# Patient Record
Sex: FEMALE | Race: WHITE | Employment: UNEMPLOYED | URBAN - METROPOLITAN AREA
[De-identification: names, ages, dates, MRNs, and addresses within clinical notes are randomized per-mention and may not be internally consistent; named-entity substitution may affect disease eponyms.]

---

## 2019-12-27 ENCOUNTER — OFFICE VISIT (OUTPATIENT)
Dept: SURGERY | Age: 29
End: 2019-12-27

## 2019-12-27 VITALS
WEIGHT: 293 LBS | SYSTOLIC BLOOD PRESSURE: 142 MMHG | DIASTOLIC BLOOD PRESSURE: 86 MMHG | HEART RATE: 79 BPM | RESPIRATION RATE: 20 BRPM | OXYGEN SATURATION: 97 % | TEMPERATURE: 98.2 F | BODY MASS INDEX: 44.41 KG/M2 | HEIGHT: 68 IN

## 2019-12-27 DIAGNOSIS — K21.9 GASTROESOPHAGEAL REFLUX DISEASE, ESOPHAGITIS PRESENCE NOT SPECIFIED: ICD-10-CM

## 2019-12-27 DIAGNOSIS — E66.01 MORBID OBESITY WITH BMI OF 45.0-49.9, ADULT (HCC): Primary | ICD-10-CM

## 2019-12-27 DIAGNOSIS — E55.9 VITAMIN D DEFICIENCY: ICD-10-CM

## 2019-12-27 DIAGNOSIS — E61.1 IRON DEFICIENCY: ICD-10-CM

## 2019-12-27 DIAGNOSIS — R63.5 WEIGHT GAIN: ICD-10-CM

## 2019-12-27 DIAGNOSIS — E53.8 B12 DEFICIENCY: ICD-10-CM

## 2019-12-27 RX ORDER — OMEPRAZOLE 20 MG/1
20 CAPSULE, DELAYED RELEASE ORAL DAILY
COMMUNITY
End: 2020-09-29 | Stop reason: SDUPTHER

## 2019-12-27 NOTE — PATIENT INSTRUCTIONS
Schedule an appointment with the dietician, please call or email   Ethelyn Kayser, RD at:    [phone]  [email or web address]      I'll follow up when I get your labs back    Watch or attend an informational seminar on gastric bypass     After you have had a visit with Ethelyn Kayser, RD, watched the seminar, had labs and the upper GI; [facility] will get you an appointment with Dr. Katherine Camacho to discuss revision

## 2019-12-27 NOTE — PROGRESS NOTES
1. Have you been to the ER, urgent care clinic since your last visit? Hospitalized since your last visit? No    2. Have you seen or consulted any other health care providers outside of the [address] [place] [address] since your last visit? Include any pap smears or colon screening. Yes OB/GYN 3 weeks ago for birth control.

## 2019-12-27 NOTE — PROGRESS NOTES
HISTORY OF PRESENT ILLNESS  Ani Samson is a 26 y.o. female. HPI  Chief Complaint   Patient presents with    Weight Gain     6 years s/p lap sleeve gastrectomy gained 49.1 pounds since last office visit. Ani Samson is 6 years s/p Sleeve gastrectomy for treatment of morbid obesity. She has not been in follow up for 4 years. She desires revision to Malabsorptive gastric bypass for treatment of morbid obesity. She has had 2 children over the past 4 years (has a 8year old, 1year old and 4 month old). Pre op weight  318 lbs   Luis   208 lbs   Current  298 lbs         Review of Systems   Constitutional: Positive for malaise/fatigue. Spouse works as  and \"all me\" most of the time with the kids and house. Tired all the time      Eyes: Negative for blurred vision. Respiratory: Negative for cough and shortness of breath. Cardiovascular: Negative for chest pain, palpitations and leg swelling. Gastrointestinal: Positive for diarrhea (always have to go ) and heartburn (prn and takes spouse's omeprazole 3 x/ week ). Negative for constipation. 1 meal / day \"just not hungry\"  Drinks 2-3 cups whole milk daily \"love milk\"  Not snacking   Main meal is dinner and \"usually not great\"   Potatoes, meat and green beans; portions > cup    Genitourinary: Negative. Musculoskeletal: Positive for myalgias. Running around after kids all day   Walks dog nightly    Skin: Positive for itching. eczema   Neurological: Negative for dizziness, tingling, seizures and headaches. Endo/Heme/Allergies:        Periods \"all over the place\"   Hx anemia     No PICA    Psychiatric/Behavioral: The patient does not have insomnia. Stress high due to young children and spouse away a lot as a         Physical Exam  Constitutional:       Appearance: She is obese. Eyes:      Extraocular Movements: Extraocular movements intact.       Pupils: Pupils are equal, round, and reactive to light. Cardiovascular:      Rate and Rhythm: Normal rate and regular rhythm. Pulmonary:      Effort: Pulmonary effort is normal.      Breath sounds: Normal breath sounds. Abdominal:      General: Bowel sounds are normal.      Palpations: Abdomen is soft. Comments: Obese     Musculoskeletal: Normal range of motion. General: No swelling. Skin:     General: Skin is warm and dry. Neurological:      General: No focal deficit present. Mental Status: She is alert and oriented to person, place, and time. Psychiatric:         Mood and Affect: Mood normal.         ASSESSMENT and PLAN    ICD-10-CM ICD-9-CM    1. Morbid obesity with BMI of 45.0-49.9, adult ([facility]) E66.01 278.01 CBC W/O DIFF    Z68.42 V85.42 VITAMIN B12 & FOLATE      VITAMIN D, 25 HYDROXY      METABOLIC PANEL, COMPREHENSIVE      IRON PROFILE      VITAMIN B1, WHOLE BLOOD      XR UPPER GI SERIES W KUB   2. Weight gain R63.5 783.1 CBC W/O DIFF      VITAMIN B12 & FOLATE      VITAMIN D, 25 HYDROXY      METABOLIC PANEL, COMPREHENSIVE      IRON PROFILE      VITAMIN B1, WHOLE BLOOD      XR UPPER GI SERIES W KUB   3. Gastroesophageal reflux disease, esophagitis presence not specified K21.9 530.81 CBC W/O DIFF      VITAMIN B12 & FOLATE      VITAMIN D, 25 HYDROXY      METABOLIC PANEL, COMPREHENSIVE      IRON PROFILE      VITAMIN B1, WHOLE BLOOD      XR UPPER GI SERIES W KUB   4. Iron deficiency E61.1 280.9 CBC W/O DIFF      VITAMIN B12 & FOLATE      VITAMIN D, 25 HYDROXY      METABOLIC PANEL, COMPREHENSIVE      IRON PROFILE      VITAMIN B1, WHOLE BLOOD   5. B12 deficiency E53.8 266.2 CBC W/O DIFF      VITAMIN B12 & FOLATE      VITAMIN D, 25 HYDROXY      METABOLIC PANEL, COMPREHENSIVE      IRON PROFILE      VITAMIN B1, WHOLE BLOOD   6.  Vitamin D deficiency E55.9 268.9 CBC W/O DIFF      VITAMIN B12 & FOLATE      VITAMIN D, 25 HYDROXY      METABOLIC PANEL, COMPREHENSIVE      IRON PROFILE      VITAMIN B1, WHOLE BLOOD     6 years s/p Sleeve gastrectomy for treatment of morbid obesity   Weight regain and BMI > 40   Needs to get back in follow up  Labs today and assess vitamins, will make recs once resulted   UGI to eval anatomy   RD visit   Seminar on gastric bypass   Reviewed differences between sleeve and gastric bypass, importance of follow up, vitamin compliance and diet/behavior changes. After RD, UGI, Labs and seminar  ; appt with Dr. Verónica Barksdale to discuss revision to Malabsorptive gastric bypass for treatment of morbid obesity   Will get her started with 6 month diet and insurance requirements   Brie Gonsalez verbalized understanding and questions were answered to the best of my knowledge and ability. Gastric bypass  educational materials were provided. 33 minutes spent in face to face with Brie Gonsalez > 50% counseling.

## 2020-01-01 LAB
25(OH)D3+25(OH)D2 SERPL-MCNC: 32.5 NG/ML (ref 30–100)
ALBUMIN SERPL-MCNC: 4.6 G/DL (ref 3.5–5.5)
ALBUMIN/GLOB SERPL: 1.4 {RATIO} (ref 1.2–2.2)
ALP SERPL-CCNC: 66 IU/L (ref 39–117)
ALT SERPL-CCNC: 49 IU/L (ref 0–32)
AST SERPL-CCNC: 43 IU/L (ref 0–40)
BILIRUB SERPL-MCNC: 0.3 MG/DL (ref 0–1.2)
BUN SERPL-MCNC: 12 MG/DL (ref 6–20)
BUN/CREAT SERPL: 17 (ref 9–23)
CALCIUM SERPL-MCNC: 10 MG/DL (ref 8.7–10.2)
CHLORIDE SERPL-SCNC: 102 MMOL/L (ref 96–106)
CO2 SERPL-SCNC: 24 MMOL/L (ref 20–29)
CREAT SERPL-MCNC: 0.7 MG/DL (ref 0.57–1)
ERYTHROCYTE [DISTWIDTH] IN BLOOD BY AUTOMATED COUNT: 15.2 % (ref 12.3–15.4)
FOLATE SERPL-MCNC: 9.6 NG/ML
GLOBULIN SER CALC-MCNC: 3.2 G/DL (ref 1.5–4.5)
GLUCOSE SERPL-MCNC: 87 MG/DL (ref 65–99)
HCT VFR BLD AUTO: 36.1 % (ref 34–46.6)
HGB BLD-MCNC: 11.3 G/DL (ref 11.1–15.9)
IRON SATN MFR SERPL: 10 % (ref 15–55)
IRON SERPL-MCNC: 33 UG/DL (ref 27–159)
MCH RBC QN AUTO: 25.5 PG (ref 26.6–33)
MCHC RBC AUTO-ENTMCNC: 31.3 G/DL (ref 31.5–35.7)
MCV RBC AUTO: 82 FL (ref 79–97)
PLATELET # BLD AUTO: 393 X10E3/UL (ref 150–450)
POTASSIUM SERPL-SCNC: 4.5 MMOL/L (ref 3.5–5.2)
PROT SERPL-MCNC: 7.8 G/DL (ref 6–8.5)
RBC # BLD AUTO: 4.43 X10E6/UL (ref 3.77–5.28)
SODIUM SERPL-SCNC: 140 MMOL/L (ref 134–144)
TIBC SERPL-MCNC: 327 UG/DL (ref 250–450)
UIBC SERPL-MCNC: 294 UG/DL (ref 131–425)
VIT B1 BLD-SCNC: 105.6 NMOL/L (ref 66.5–200)
VIT B12 SERPL-MCNC: 472 PG/ML (ref 232–1245)
WBC # BLD AUTO: 9.3 X10E3/UL (ref 3.4–10.8)

## 2020-01-14 ENCOUNTER — CLINICAL SUPPORT (OUTPATIENT)
Dept: SURGERY | Age: 30
End: 2020-01-14

## 2020-01-14 ENCOUNTER — HOSPITAL ENCOUNTER (OUTPATIENT)
Dept: GENERAL RADIOLOGY | Age: 30
Discharge: HOME | End: 2020-01-14
Attending: NURSE PRACTITIONER
Payer: MEDICAID

## 2020-01-14 DIAGNOSIS — E66.01 MORBID OBESITY WITH BMI OF 45.0-49.9, ADULT (HCC): ICD-10-CM

## 2020-01-14 DIAGNOSIS — E66.01 MORBID OBESITY WITH BMI OF 45.0-49.9, ADULT (HCC): Primary | ICD-10-CM

## 2020-01-14 DIAGNOSIS — R63.5 WEIGHT GAIN: ICD-10-CM

## 2020-01-14 DIAGNOSIS — K21.9 GASTROESOPHAGEAL REFLUX DISEASE, ESOPHAGITIS PRESENCE NOT SPECIFIED: ICD-10-CM

## 2020-01-14 PROCEDURE — 74240 X-RAY XM UPR GI TRC 1CNTRST: CPT

## 2020-01-15 VITALS — WEIGHT: 293 LBS | BODY MASS INDEX: 45.98 KG/M2

## 2020-01-15 NOTE — PROGRESS NOTES
Pre-operative Bariatric Nutrition Evaluation ()     Date: 2020  Lala Zaragoza M.D. Name: Gina Lujan  :  1990  Age:  31  Gender: Female   Type of Surgery: [x]           Gastric Bypass   []           Sleeve Gastrectomy    ASSESSMENT:    Past Medical History:HTN     Medications/Supplements:   Prior to Admission medications    Medication Sig Start Date End Date Taking? Authorizing Provider   levonorgestrel (MIRENA) 20 mcg/24 hours (5 yrs) 52 mg IUD 1 Device by IntraUTERine route once. Provider, Historical   omeprazole (PRILOSEC) 20 mg capsule Take 20 mg by mouth daily. Provider, Historical   prenatal vits w-Ca,Fe,FA,1 mg, (PRENATAL 1 + IRON PO) Take  by mouth. Provider, Historical       Food Allergies/Intolerances:none    Anthropometrics:    Ht:67.5\"   Wt: 298#    IBW: 137.5#     %IBW: 216%     BMI:45     Category: obesity III     Reported wt history: Pt presents today for pre-op nutrition evaluation for wt loss surgery. Pt with previous sleeve gastrectomy in . Reports losing from 348# down to 184# indicating a 164# wt loss. States she maintained the wt loss for several years and began re-gaining wt d/t pregnancies. Pt currently has a 3year old and a 2 month old. Pt does report \"never feeling hungry\" and feeling full from no more than 1 cup food per meal. Pt is now seeking approval for weight loss surgery revision to gastric bypass. Pt will need to complete 6 months of supervised wt loss for insurance requirements.    Exercise/Physical Activity:no intentional exercise stating lack of time    Reported Diet History:Atkins, Weight Watchers, diet pills, liquid diets    24 Hour Diet Recall  Breakfast  2 eggs, hernandez or sausage, whole milk \"all day\"    Lunch  Usually skips stating \"never hungry\"    Dinner  Protein, veggie, starch    Snacks  Veggies and dip    Beverages  Whole milk \"all day\", water, tea; states water intake is minimal d/t heartburn Environment/Psychosocial/Support:pt reports , mother and mother-in-law are main support system and rates level of support a 10 out of 10. 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 She is often managing her two young children on her own (ages 3 and 1 months old). Pt's mother helps out with the kids. Pt does all of the grocery shopping and cooking for their household. Pt states she has several friends who have had gastric bypass and have done well. NUTRITION DIAGNOSIS:  1. Self-monitoring deficit r/t skipping meals evidenced by pt reports skipping lunch most days d/t \"not feeling hungry\". 2. Physical inactivity r/t perception that time constraints prevent exercise evidenced by pt with no intentional exercise at this time. NUTRITION INTERVENTION:  Pt educated on nutrition recommendations for weight loss surgery, specifically gastric bypass. Instructed on consuming 3 meals per day starting now. Use the balanced plate method to plan meals, include 3 oz of lean source of protein, 1/2 cup whole grains, unlimited non-starchy vegetables, 1/2 cup fruit and 1 serving of low fat dairy. Utilize handouts listing healthy snack and meal ideas to limit restaurant meals. After surgery measure all meals to 1/2 cup. Each meal will contain a 1/4 cup lean protein and 1/4 cup fruit, non-starchy vegetable or starch (limiting to once per day). Aim for 60 g protein per day. Sip on 48-64 oz of sugar free, calorie free, non-carbonated beverages each day. Do not use a straw. Do not consume beverages 30 minutes before, during or 30 minutes after meals. Read all nutrition labels. Demonstrated and emphasized identifying serving size, total fat, sugar and protein content. Defined low fat as </= 3 g per serving. Discussed lean and extra lean sources of protein. Provided list of low fat cooking methods.  Avoid foods with sugar listed in the first 3 ingredients and >/15 g sugar per serving. Excess sugar/fat intake may lead to dumping syndrome. Discussed signs and symptoms of dumping syndrome. Practice mindful eating habits; take small bites, chew thoroughly, avoid distractions, utilize hunger/fullness scale. Consume meals over 20-30 minutes. Attend Bariatric Support Group and increase physical activity (approved per MD) for long term weight maintenance. NUTRITION MONITORING AND EVALUATION:    The following goals were established with patient;  1. Replace whole milk with lower fat/lower calorie alternatives. Specific examples provided. Drink mostly calorie-free, sugar-free and non-carbonated fluids and avoid drinking with meals (30/30 rule). 2. Eat 3 meals a day. Do not skip meals. Use protein shakes at lunch instead of skipping the meal to help achieve adequate protein intake which can help promote wt loss. 3. Measure meals to 1 cup total per meal (pt still feels restricted with this portion size). Include lean protein and produce at each meal. Sample meal ideas provided. 4. Implement intentional exercise to help promote wt loss. We discussed using online exercise videos while children are napping during the day. 5. Follow up next month for continued nutrition education and supervised weight loss. Specific tips and techniques to facilitate compliance with above recommendations were provided and discussed. Nutrition evaluation reveals important lifestyle changes are indicated. Goals set and recommendations made. Will continue to assess as pt works to complete supervised weight loss requirements. If further details are desired please feel free to contact me at [phone]. This phone number was also provided to the patient for any further questions or concerns.            Sheldon Mcnally RD

## 2020-02-06 ENCOUNTER — OFFICE VISIT (OUTPATIENT)
Dept: SURGERY | Age: 30
End: 2020-02-06

## 2020-02-06 VITALS
HEART RATE: 88 BPM | WEIGHT: 293 LBS | OXYGEN SATURATION: 98 % | DIASTOLIC BLOOD PRESSURE: 104 MMHG | TEMPERATURE: 98.8 F | BODY MASS INDEX: 44.41 KG/M2 | HEIGHT: 68 IN | SYSTOLIC BLOOD PRESSURE: 160 MMHG | RESPIRATION RATE: 20 BRPM

## 2020-02-06 DIAGNOSIS — J45.20 MILD INTERMITTENT ASTHMA WITHOUT COMPLICATION: ICD-10-CM

## 2020-02-06 DIAGNOSIS — I10 ESSENTIAL HYPERTENSION: ICD-10-CM

## 2020-02-06 DIAGNOSIS — E66.01 MORBID OBESITY (HCC): Primary | ICD-10-CM

## 2020-02-06 DIAGNOSIS — R12 HEARTBURN: ICD-10-CM

## 2020-02-06 NOTE — PROGRESS NOTES
1. Have you been to the ER, urgent care clinic since your last visit? Hospitalized since your last visit? No    2. Have you seen or consulted any other health care providers outside of the [address] [place] [address] since your last visit? Include any pap smears or colon screening.  No

## 2020-02-10 NOTE — PROGRESS NOTES
Subjective: The patient is a 31 y.o. White female with a history of morbid obesity managed via sleeve gastrectomy in ; lost approximately 115 lbs but has experienced gradual weight regain of 90 lbs with 2 recent pregnancies. Bariatric comorbidities present are   Patient Active Problem List   Diagnosis Code    Morbid obesity ([facility] E66.01    Hypertension I10    Asthma J45.909    Chronic headaches R51    Snoring R06.83    Low back pain M54.5    Bilateral knee pain M25.561, M25.562    Chronic foot pain M79.673, G89.29    Heartburn R12    Anemia, iron deficiency D50.9    Vitamin D deficiency E55.9     The patient desires revision to gastric bypass for additional surgical weight loss. The patients goal weight is 190 lbs. The highest acceptable weight is 230 lbs. These goals are consistent with expected outcomes of their desired operation. her Medical goals are hypertension resolution;  her qualty of life goals are decreased fatigue.     Patient Active Problem List    Diagnosis Date Noted    Anemia, iron deficiency 2013    Vitamin D deficiency 2013    Morbid obesity ([facility] 2012    Hypertension 2012    Asthma 2012    Chronic headaches 2012    Snoring 2012    Low back pain 2012    Bilateral knee pain 2012    Chronic foot pain 2012    Heartburn 2012      Past Surgical History:   Procedure Laterality Date    ABDOMEN SURGERY PROC UNLISTED      HX  SECTION  2019    HX CHOLECYSTECTOMY      laparoscopic  in     HX GASTRECTOMY  13    lap slleve gastrectomy by Dr Tala Beltre  2007    jaws wired shut and bone graft at [facility] by Dr Priyanka Ortega  2009    humerous reconstruction after injury by Dr Paresh Dover at [place] [address]    at [place] [address] Use    Smoking status: Former Smoker     Packs/day: 0.50     Years: 7.00     Pack years: 3.50     Last attempt to quit: 2011     Years since quittin.3    Smokeless tobacco: Never Used   Substance Use Topics    Alcohol use: Yes     Alcohol/week: 1.7 standard drinks     Types: 2 Cans of beer per week     Comment: RARELY      Family History   Problem Relation Age of Onset    Obesity Mother    Oswlado Olivera Migraines Mother     Hypertension Mother     Kidney Disease Brother     Cancer Maternal Grandmother         thyroid cancer    Heart Disease Maternal Grandfather     Hypertension Maternal Grandfather       Prior to Admission medications    Medication Sig Start Date End Date Taking? Authorizing Provider   levonorgestrel (MIRENA) 20 mcg/24 hours (5 yrs) 52 mg IUD 1 Device by IntraUTERine route once. Yes Provider, Historical   omeprazole (PRILOSEC) 20 mg capsule Take 20 mg by mouth daily. Yes Provider, Historical   prenatal vits w-Ca,Fe,FA,1 mg, (PRENATAL 1 + IRON PO) Take  by mouth.    Yes Provider, Historical     Allergies   Allergen Reactions    Latex Rash    Bactrim [Sulfamethoxazole-Trimethoprim] Rash    Ceclor [Cefaclor] Rash    Erythromycin Rash    Keflex [Cephalexin] Rash    Morphine Nausea Only    Sulfa (Sulfonamide Antibiotics) Rash         Review of Systems:    Constitutional: positive for weight gain  Eyes: positive for contacts/glasses  Ears, nose, mouth, throat, and face: positive for snoring  Respiratory: positive for asthma, wheezing or dyspnea on exertion, negative for pneumonia  Cardiovascular: negative for chest pressure/discomfort, palpitations  Gastrointestinal: positive for dyspepsia and reflux symptoms, negative for nausea and vomiting  Genitourinary:negative  Integument/breast: negative  Hematologic/lymphatic: negative  Musculoskeletal:positive for arthralgias and back pain  Neurological: negative for paresthesia    Objective:     Visit Vitals  BP (!) 160/104   Pulse 88   Temp 98.8 °F (37.1 °C)   Resp 20   Ht 5' 7.5\" (1.715 m)   Wt 296 lb (134.3 kg)   SpO2 98% BMI 45.68 kg/m²       Physical Exam:  GENERAL: alert, cooperative, no distress, appears stated age, morbidly obese, EYE: negative, LYMPHATIC: Cervical, supraclavicular nodes normal. THROAT & NECK: normal, LUNG: clear to auscultation bilaterally, HEART: regular rate and rhythm, S1, S2 normal, no murmur. ABDOMEN: Obese, non-distended, soft. NABS. Well healed scars. No pain with palpation or mass. EXTREMITIES:  extremities normal, atraumatic, no cyanosis or edema, SKIN: Normal., NEUROLOGIC: negative. Assessment:     Recurrent morbid obesity with comorbidities; no success with medical management. UGI with spontaneous reflux; normal morphology of sleeve. Plan:     Laparoscopic revision of sleeve gastrectomy to gastric bypass. This is a 31 y.o. female with a BMI of Body mass index is 45.68 kg/m². and the weight-related co-morbidities listed above. [facility] meets the NIH criteria for bariatric surgery based upon the BMI of Body mass index is 45.68 kg/m². and multiple weight-related co-morbidities. [facility] has elected Laparoscopic revision of sleeve gastrectomy to gastric bypass as her intervention of choice for treatment of morbid obesity through surgical means secondary to its long term history of success. In the office today, following Kimberley's history and physical examination, a 30 minute discussion regarding the anatomic alterations for the laparoscopic roula-en-Y gastric bypass was undertaken. The dietary expectations and the patient and physician dependent factors for success were thoroughly discussed, to include the need for interval follow-up and long-term dietary changes associated with success. The possible complications of the roula-en-Y gastric bypass  were also discussed, to include VTE, staple line leak, bleeding, stricture, infection, internal hernia, open procedure, GERD/ulcer/stricture, poor weight loss/weight regain, and pouch dilation.   Specific weight related outcomes for success were also discussed with an emphasis on careful and close follow-up with the first year. The patient expressed an understanding of the above factors, and her questions were answered in their entirety. In addition, the patient attended a 1.5 hour power point seminar regarding obesity, surgical weight loss including, adjustable gastric band, gastric bypass, and sleeve gastrectomy. This discussion contrasted the different surgical techniques, mechanisms of actions and expected outcomes, and surgical and medical risks associated with each procedure. During this seminar, there was a long question and answer session where each questions was answered until there were no additional questions. Today, the patient had all of her questions answered and desires to proceed with pre-qualification for revisional bariatric surgery initially choosing Laparoscopic revision of sleeve gastrectomy to gastric bypass as her surgical option. She will complete 6-month supervised weight loss program and schedule Psychology evaluation.     Signed By: Dayo Reyes MD     February 10, 2020

## 2020-02-10 NOTE — PATIENT INSTRUCTIONS
Learning About Bariatric Surgery  What is bariatric surgery? Bariatric surgery is surgery to help you lose weight. This type of surgery is only used for people who are very overweight and have not been able to lose weight with diet and exercise. This surgery makes the stomach smaller. Some types of surgery also change the connection between your stomach and intestines. How is bariatric surgery done? Bariatric surgery may be either \"open\" or \"laparoscopic. \" Open surgery is done through a large cut (incision) in the belly. Laparoscopic surgery is done through several small cuts. The doctor puts a lighted tube, or scope, and other surgical tools through small cuts in your belly. The doctor is able to see your organs with the scope. There are different types of bariatric surgery. Gastric sleeve surgery  The surgery is usually done through several small incisions in the belly. The doctor removes more than half of your stomach. This leaves a thin sleeve, or tube, that is about the size of a banana. Because part of your stomach has been removed, this can't be reversed. Christiano-en-Y gastric bypass surgery  Christiano-en-Y (say \"franco-en-why\") surgery changes the connection between the stomach and the intestines. The doctor separates a section of your stomach from the rest of your stomach. This makes a small pouch. The new pouch will hold the food you eat. The doctor connects the stomach pouch to the middle part of the small intestine. Gastric banding surgery  The surgery is usually done through several small incisions in the belly. The doctor wraps a band around the upper part of the stomach. This creates a small pouch. The small size of the pouch means that you will get full after you eat just a small amount of food. The doctor can inflate or deflate the band to adjust the size. This lets the doctor adjust how quickly food passes from the new pouch into the stomach.  It does not change the connection between the stomach and the intestines. What can you expect after the surgery? You may stay in the hospital for one or more days after the surgery. How long you stay depends on the type of surgery you had. Most people need 2 to 4 weeks before they are ready to get back to their usual routine. For the first 2 to 6 weeks after surgery, you probably will need to follow a liquid or soft diet. Bit by bit, you will be able to eat more solid foods. Your doctor may advise you to work with a dietitian. This way you'll be sure to get enough protein, vitamins, and minerals while you are losing weight. Even with a healthy diet, you may need to take vitamin and mineral supplements. After surgery, you will not be able to eat very much at one time. You will get full quickly. Try not to eat too much at one time or eat foods that are high in fat or sugar. If you do, you may vomit, get stomach pain, or have diarrhea. You probably will lose weight very quickly in the first few months after surgery. As time goes on, your weight loss will slow down. You will have regular doctor visits to check how you are doing. Think of bariatric surgery as a tool to help you lose weight. It isn't an instant fix. You will still need to eat a healthy diet and get regular exercise. This will help you reach your weight goal and avoid regaining the weight you lose. Follow-up care is a key part of your treatment and safety. Be sure to make and go to all appointments, and call your doctor if you are having problems. It's also a good idea to know your test results and keep a list of the medicines you take. Where can you learn more? Go to http://madison-gissel.info/. Enter G469 in the search box to learn more about \"Learning About Bariatric Surgery. \"  Current as of: March 28, 2019  Content Version: 12.2  © 0553-2925 [employer].  Care instructions adapted under license by [employer] (which disclaims liability or warranty for this information). If you have questions about a medical condition or this instruction, always ask your healthcare professional. [place] [address] any warranty or liability for your use of this information.

## 2020-02-12 ENCOUNTER — CLINICAL SUPPORT (OUTPATIENT)
Dept: SURGERY | Age: 30
End: 2020-02-12

## 2020-02-17 VITALS — WEIGHT: 293 LBS | BODY MASS INDEX: 46.79 KG/M2

## 2020-02-17 NOTE — PROGRESS NOTES
[employer] [facility]  Supervised Weight Loss     Date:   2020    Patient's Name: Oly Mcdonough  : 1990    Insurance:  [facility] [employer]         Session: 2 of  6  Surgery: Gastric Bypass with h/o Sleeve Gastrectomy   Surgeon:  Fransisco Meek M.D. Height: 67.5\" Weight:    303.2#      Lbs. (Please note weight was taken 2020 but was not recorded until  when this writer completed the note)   BMI: 46   Pounds Lost since last month: 0               Pounds Gained since last month: 5    Starting Weight: 298#   Previous Months Weight: 298#  Overall Pounds Lost: 0 Overall Pounds Gained: 5    Other Pertinent Information: n/a    Smoking Status:  none  Alcohol Intake: 2-3 drinks once a month    I have reviewed with pt the guidelines of the supervised wt loss program.  Pt understands the expectations of some wt loss during the program and that wt gain could delay the process. I have also explained that appointments need to be consecutive and missing an appointment may result in starting over. Pt has received this information in writing. Changes that patient has made since last month include:  None reported. Eating Habits and Behaviors  General healthy eating guidelines were also discussed. Pts were instructed that their plate should be made up 1/2 plate coming from non-starchy vegetables, 1/4 coming from lean meat, and 1/4 of their plate coming from carbohydrates, including fruits, starches, or milk. We discussed measuring meals to 1/2 cup total per meal after surgery. Drinking only calorie-free, sugar-free and non-carbonated beverages. We discussed the importance of drinking 64 ounces of fluid per day to prevent dehydration post-operatively.                        Patient's current diet habits include: eating 2-3 meals daily, snacking on cheese/crackers, grapes, pork rinds, and many other refined carbs, especially before dinner at home. Pt does the cooking and grocery shopping, uses only grilled and broil cook methods, eats \"normal\" serving sizes with no second servings. Pt eats out 1-3 times/weekly. Beverages include 10oz water daily, 16 oz sweet tea daily, and 14 oz milk daily. Physical Activity/Exercise  We talked about the importance of increasing daily physical activity and beginning to develop an exercise regimen/routine. We talked about exercise as being an important part of long term weight loss after surgery. Comments:  During class, I discussed with patient the importance of getting into an exercise routine. Pt is currently walking daily for 1-2 hours for activity. Pt has been encouraged to increase as tolerated to 150 minutes weekly. Behavior Modification       A behavior modification lesson was provided with an emphasis on developing mindful eating behaviors. We talked about how to eat more mindfully and identify emotional eating triggers. Tips and recommendations for how to make these changes were provided. Pt was encouraged to keep a food journal and record what they were taking in daily. Overall Assessment: Pt had 5# gain since last month, and reported no changes. Pt still consuming high intake of refined carbohydrates, very little water, and heaving snacking. Pt has many dietary and lifestyle changes to modify before becoming an appropriate candidate for bariatric surgery. Pt encouraged to review and begin working on the general eating tips provided at first nutrition visit. Will assess at next visit. Patient-Set Goals:   1. Nutrition - reduce refined carbs, especially by not snacking on sweets and reading food labels. 2. Exercise - increase exercise as tolerated, increasing to two walks daily by waking before the children and/or walking with children in the neighborhood. 3. Behavior - To be more positive about changes and focus on the general eating tips.     Jaye Langford, RD  2/17/2020

## 2020-03-11 ENCOUNTER — CLINICAL SUPPORT (OUTPATIENT)
Dept: SURGERY | Age: 30
End: 2020-03-11

## 2020-03-11 VITALS — BODY MASS INDEX: 45.8 KG/M2 | WEIGHT: 293 LBS

## 2020-03-11 DIAGNOSIS — E66.01 MORBID OBESITY (HCC): Primary | ICD-10-CM

## 2020-03-30 NOTE — PROGRESS NOTES
[facility] at [facility]  Supervised Weight Loss     Date:   3/11/2020    Patient's Name: Hussein Reese  : 1990    Insurance: [facility] [employer]    Session: 3 of  6  Surgery: Gastric Bypass with h/o sleeve gastrectomy  Surgeon:  Yadi Aparicio M.D. Height: 67.5\" Weight:    296#      Lbs. BMI: 45   Pounds Lost since last month: 7#               Pounds Gained since last month: 0    Starting Weight: 298#   Previous Months Weight: 303#  Overall Pounds Lost: 2# Overall Pounds Gained: 0    Other Pertinent Information: n/a    Smoking Status:  none  Alcohol Intake: 2 drinks, 1-2 times per month    I have reviewed with pt the guidelines of the supervised wt loss program.  Pt understands the expectations of some wt loss during the program and that wt gain could delay the process. I have also explained that appointments need to be consecutive and missing an appointment may result in starting over. Pt has received this information in writing. Changes that patient has made since last month include:  Stopped drinking vit d whole milk, cut back on refined carbs/sweets. Eating Habits and Behaviors  General healthy eating guidelines were discussed. A nutrition lesson was presented on portion control. Patients were instructed implement portion control now using the balanced plate method (1/2 plate non-starchy vegetables, 1/4 plate lean meat, and 1/4 plate whole grains and to include fruit and/or milk at meals or snack). We discussed measuring meals to 1/2 cup total per meal after surgery and appropriate portion progression long term. Patient's current diet habits include: 2-3 meals every day, snacks on fruits/vegetables, pork rinds. Pretzels 2x/week. Eats out 1-3 times per week. Baked/grilled/broiled options only. Rarely water, 20 oz sweet tea every day, milk 1% 1-2 cups every day.   Emotionally eats sometimes but cleans and plays with kids to distract. Food choices listed as biggest trigger to managing weight. Physical Activity/Exercise  We talked about the importance of increasing daily physical activity and beginning to develop an exercise regimen/routine. We talked about exercise as being an important part of long term weight loss after surgery. Comments:  During class, I discussed with patient the importance of getting into an exercise routine. Pt is currently walking daily for 2-3 hours for activity. Pt has been encouraged to continue as tolerated. Behavior Modification       We talked about how to eat more mindfully. Tips and recommendations for how to make these changes were provided. Pt was encouraged to keep a food journal and record what they were taking in daily. Overall Assessment: Pt making changes as evidenced by weight loss. Will continue to assess. Patient-Set Goals:   1. Nutrition - no soda  2. Exercise - keep walking  3.  Behavior -try new food    Dirk Sanders RD  3/11/2020

## 2020-04-08 ENCOUNTER — CLINICAL SUPPORT (OUTPATIENT)
Dept: SURGERY | Age: 30
End: 2020-04-08

## 2020-04-08 DIAGNOSIS — E66.01 MORBID OBESITY (HCC): Primary | ICD-10-CM

## 2020-04-29 VITALS — BODY MASS INDEX: 45.68 KG/M2 | WEIGHT: 293 LBS

## 2020-04-29 NOTE — PROGRESS NOTES
[employer] [facility] at [facility]  Supervised Weight Loss     Date:   2020    Patient's Name: Tessy Spencer  : 1990    [facility]          Session: 4 of  6  Surgery: Gastric Bypass with h/o sleeve gastrectomy  Surgeon:  Lyndon Denny M.D. Height: 67.5\" Weight:    296#      Lbs. BMI: 45   Pounds Lost since last month: 0               Pounds Gained since last month: 0    Starting Weight: 298#   Previous Months Weight: 296#  Overall Pounds Lost: 2# Overall Pounds Gained: 0    Other Pertinent Information:Today's visit was by telephone in accordance to the guidelines and recommendations of the Centers for Disease Control and Prevention and the [facility] Department of Health policies on JAEJJ-50. Poly Page weight was pulled from last months visit, 3/11/2020     Smoking Status: none  Alcohol Intake: 3 drinks once per week    I have reviewed with pt the guidelines of the supervised wt loss program.  Pt understands the expectations of some wt loss during the program and that wt gain could delay the process. I have also explained that appointments need to be consecutive and missing an appointment may result in starting over. Pt has received this information in writing. Changes that patient has made since last month include: no fast food, no soda, incorporated more exercise. Eating Habits and Behaviors  General healthy eating guidelines were also discussed. Pts were instructed that their plate should be made up 1/2 plate coming from non-starchy vegetables, 1/4 coming from lean meat, and 1/4 of their plate coming from carbohydrates, including fruits, starches, or milk. We discussed measuring meals to 1/2 cup total per meal after surgery. Drinking only calorie-free, sugar-free and non-carbonated beverages. We discussed the importance of drinking 64 ounces of fluid per day to prevent dehydration post-operatively. Patient's current diet habits include: 2-3 meals per day, snacking on fruit, vegetables, crackers, and pork rinds. No more sweets in diet. Bread and pasta a few times per week. Rarely eats out, chooses mostly baked/grilled options. 16 oz sweet tea, 16 oz caffeine beverages. Emotionally eats sometimes but tries crafting as an alternative to eating. Physical Activity/Exercise  An exercise presentation was provided including information about exercise programs available both before and after surgery. We talked about the importance of increasing daily physical activity and beginning to develop an exercise regimen/routine. We talked about exercise as being an important part of long term weight loss after surgery. Comments:  During class, I discussed with patient the importance of getting into an exercise routine. Pt is currently walking and exercise videos 3 days/week for 2 hours/day for activity. Pt has been encouraged to maintain and/or increase as tolerated. Behavior Modification       We talked about how to eat more mindfully. Tips and recommendations for how to make these changes were provided. Pt was encouraged to keep a food journal and record what they were taking in daily. Overall Assessment: Pt is making many appropriate diet and lifestyle changes as evidenced by recall. Will continue to assess. Patient-Set Goals:   1. Nutrition - keep buying healthy foods  2. Exercise - add another 1-2 hours of exercise weekly  3. Behavior -stay positive.     Neeraj Figueroa RD  4/8/2020

## 2020-05-12 ENCOUNTER — OFFICE VISIT (OUTPATIENT)
Dept: SURGERY | Age: 30
End: 2020-05-12

## 2020-05-12 DIAGNOSIS — E66.01 MORBID OBESITY (HCC): Primary | ICD-10-CM

## 2020-05-12 NOTE — PROGRESS NOTES
[facility] at [facility]  Supervised Weight Loss     Date:   2020    Patient's Name: Kellen Zacarias  : 1990     [facility]          Session: 5 of  6  Surgery: Gastric Bypass with h/o sleeve gastrectomy                Surgeon:  June Eng M.D.     Height: 67.5\"   Reported Weight:    29#      Lbs. BMI: 44             Pounds Lost since last month: 0               Pounds Gained since last month: 0     Starting Weight: 298#                       Previous Months Weight: 296#  Overall Pounds Lost: 2#      Overall Pounds Gained: 0     Other Pertinent Information:Today's visit was by telephone in accordance to the guidelines and recommendations of the Centers for Disease Control and Prevention and the [facility] Department of Health policies on XTT-73. Tres Jennings weight was reported using a home scale.      Smoking Status: none  Alcohol Intake: 2 drinks once per week    I have reviewed with pt the guidelines of the supervised wt loss program.  Pt understands the expectations of some wt loss during the program and that wt gain could delay the process. I have also explained that appointments need to be consecutive and missing an appointment may result in starting over. Pt has received this information in writing. Changes that patient has made since last month include:  Stopped eating all fried foods, less bread, better sleep. Eating Habits and Behaviors  A nutrition lesson was presented on label reading with specific guidelines provided for limiting added sugars. This information will help increase healthy food choices, promote weight loss and prevent dumping syndrome after gastric bypass. We also reviewed the general nutrition guidelines for bariatric surgery. Patient's current diet habits include: eating 2-3 meals per day.  Snacking on veggies and dip.eating refined carbohydrates 2-3 times per week. Denies intake of sweets/desserts. Drinking water, sweet tea and milk. States snacking and late night eating are biggest barriers to weight loss. Sometimes emotional eating. Physical Activity/Exercise  We talked about the importance of increasing daily physical activity and beginning to develop an exercise regimen/routine. We talked about exercise as being an important part of long term weight loss after surgery. Comments:  During class, I discussed with patient the importance of getting into an exercise routine. Pt is currently exercising 4 hours per week for activity. Pt has been encouraged to maintain and increase as tolerated. Behavior Modification       We talked about how to eat more mindfully. Tips and recommendations for how to make these changes were provided. Pt was encouraged to keep a food journal and record what they were taking in daily. Overall Assessment: Pt demonstrates appropriate lifestyle changes evidenced by reported weight loss and reported changes. Will continue to assess as pt works to complete supervised weight loss requirements. Patient-Set Goals:   1. Nutrition - continue to avoid fried foods and eat more greens   2. Exercise - be consistent   3.  Behavior -ask for help when I feel defeated     Junior Salcido, RD  5/12/2020

## 2020-06-09 ENCOUNTER — CLINICAL SUPPORT (OUTPATIENT)
Dept: SURGERY | Age: 30
End: 2020-06-09

## 2020-06-09 DIAGNOSIS — E66.01 MORBID OBESITY (HCC): Primary | ICD-10-CM

## 2020-07-15 NOTE — PROGRESS NOTES
[facility]  Supervised Weight Loss     Date:   2020    Patient's Name: Kimberley Griffin Care  : 1990     [facility] [employer] [facility]          Session: 5 AN  6  Surgery: Gastric Bypass with h/o sleeve gastrectomy                Surgeon: Ilene Newman M.D.     Height: 67.5\"     Previous Month's Reported Weight:    209#      ZPB.                                    BMI: 44             Pounds Lost since last month: 0                 Pounds Gained since last month: 0     Starting Weight: 298#                       Previous Months Weight: 296#  Overall Pounds Lost: 2#       Overall Pounds Gained: 0     Other Pertinent Information:Today's visit was by telephone in accordance to the guidelines and recommendations of the Centers for Disease Control and Prevention and the [facility] Department of Health policies on . Pt did not provide a reported weight for today's visit. Pt turned in required paperwork for today's visit on 7/15/2020.      Smoking Status: none  Alcohol Intake: 2 drinks once per week    I have reviewed with pt the guidelines of the supervised wt loss program.  Pt understands the expectations of some wt loss during the program and that wt gain could delay the process. I have also explained that appointments need to be consecutive and missing an appointment may result in starting over. Pt has received this information in writing. Changes that patient has made since last month include: Increased walking by 10 minutes, no pasta, no caffeine. Eating Habits and Behaviors  A nutrition lesson specific to vitamins was provided. We discussed the various reasons for needing vitamins and different types and doses. General healthy eating guidelines were also discussed.  Pts were instructed that their plate should be made up 1/2 plate coming from non-starchy vegetables, 1/4 coming from lean meat, and 1/4 of their plate coming from carbohydrates, including fruits, starches, or milk. We discussed measuring meals to 1/2 cup total per meal after surgery. Drinking only calorie-free, sugar-free and non-carbonated beverages. We discussed the importance of drinking 64 ounces of fluid per day to prevent dehydration post-operatively. Patient's current diet habits include: eating 3 meals a day. Snacking on nuts and veggies. Eating pretzels weekly. Denies intake of sweets/desserts. Eating baked, grilled, broiled foods. Eating out is once a month. Drinking water, sweetened tea and Crystal [place]. Reports sometimes emotional eating. Reports food choices and snacking are biggest barriers to wt loss. Physical Activity/Exercise  We talked about the importance of increasing daily physical activity and beginning to develop an exercise regimen/routine. We talked about exercise as being an important part of long term weight loss after surgery. Comments:  During class, I discussed with patient the importance of getting into an exercise routine. Pt is currently walking and swimming for 2 hours, twice per week for activity. Pt has been encouraged to maintain and increase as tolerated. Behavior Modification       We talked about how to eat more mindfully and identify emotional eating triggers. Tips and recommendations for how to make these changes were provided. Pt was encouraged to keep a food journal and record what they were taking in daily. Overall Assessment: Pt demonstrates appropriate lifestyle changes evidenced by reported changes and previously reported wt loss. Demonstrates understanding of basic nutrition guidelines for bariatric surgery and appears to be an appropriate candidate at this time. Patient-Set Goals:   1. Nutrition - continue to find healthier foods   2. Exercise - maintain what I'm doing   3.  Behavior -stay motivated     Jeffy Grajeda, JORDAN  7/15/2020

## 2020-08-11 ENCOUNTER — OFFICE VISIT (OUTPATIENT)
Dept: SURGERY | Age: 30
End: 2020-08-11
Payer: MEDICAID

## 2020-08-11 VITALS
HEIGHT: 68 IN | WEIGHT: 283 LBS | SYSTOLIC BLOOD PRESSURE: 180 MMHG | HEART RATE: 97 BPM | TEMPERATURE: 98.1 F | DIASTOLIC BLOOD PRESSURE: 110 MMHG | OXYGEN SATURATION: 98 % | RESPIRATION RATE: 20 BRPM | BODY MASS INDEX: 42.89 KG/M2

## 2020-08-11 DIAGNOSIS — E66.01 MORBID OBESITY (HCC): Primary | ICD-10-CM

## 2020-08-11 DIAGNOSIS — K21.9 GASTROESOPHAGEAL REFLUX DISEASE WITHOUT ESOPHAGITIS: ICD-10-CM

## 2020-08-11 PROCEDURE — 99213 OFFICE O/P EST LOW 20 MIN: CPT | Performed by: SURGERY

## 2020-08-11 NOTE — PROGRESS NOTES
1. Have you been to the ER, urgent care clinic since your last visit? Hospitalized since your last visit? No    2. Have you seen or consulted any other health care providers outside of the [address] [place] [address] since your last visit? Include any pap smears or colon screening.  No

## 2020-08-12 NOTE — PATIENT INSTRUCTIONS
Learning About How to Prepare for [address] [place] [address] Surgery How can you prepare for weight-loss surgery? Having weight-loss surgery (also called bariatric surgery) is a big step. You can prepare for surgery by having a plan. Your plan may include your goals for losing weight and how to makes changes in your diet, activity, and lifestyle to help raise your chances of success. One way to prepare for surgery is to think about your goal or reason why you want to reach a healthy weight. Do you want to lower your blood pressure, cholesterol, or blood sugar? Do you want to be able to sleep better, play with your kids, or walk around the block? Having a reason can help you stay with your plan and meet your goals. Your weight-loss surgery team can help you meet your goals and get ready for surgery. Carola Caba work with a team that's trained to help you lose weight and make healthy changes in your life. This team may include: · A medical doctor or nurse to help manage your care and schedule tests before surgery. · A surgeon who specializes in weight-loss surgery. · A registered dietitian to help you plan meals and make changes in the way you eat. · An exercise specialist to help you be more active and get stronger. · A therapist or counselor to help you learn why you eat and teach you ways to deal with stress and your emotions. Your team will also be there to help you prepare for life after surgery. They will help you adjust to new ways of eating and changes to your body. How will weight-loss surgery affect your life? You have likely thought a lot about how surgery may affect your lifehow you will eat, how your body will look, or how you will feel. Some people feel overwhelmed with these changes. But planning can help you prepare for the changes and meet your weight-loss goals. One important step in your plan is to learn about the ways surgery will affect your life. These may include: · A slimmer you. You probably will lose weight very quickly in the first few months after surgery. As time goes on, your weight loss will slow down. How much weight you lose depends on what type of surgery you had and how well your new eating and activity plans are working for you. · A new way of eating. Success in reaching and keeping a healthy weight depends on making lifelong changes in how you eat. After surgery, you raise your chances of success if you: 
? Eat just a few ounces of food at a time. ? Eat very slowly and chew your food to mush. ? Don't drink for 30 minutes before you eat, during your meal, and for 30 minutes after you eat. ? Are careful about drinking alcohol. ? Avoid foods that are high in fat or sugar. ? Take vitamin and mineral supplements. · A healthier you. Weight-loss surgery can have some real health benefits. Problems like diabetes, high blood pressure, and sleep apnea may go awayor at least become easier to manage. · A more active you. After surgery, being active on most days of the week will help you reach your weight goal and avoid gaining back the weight you lose. · A lot of extra skin. When you lose weight quickly, you may have a lot of extra skin. That's normal. You can have surgery to remove the extra skin if it bothers you. There are going to be some ups and downs while you get used to these changes. So another way to adjust is to identify who can help support you. Getting support from friends and family can help. And joining a support group for people who have had the surgery can be a big help too, because they know what you're going through. As you know, it's a big decision to have weight-loss surgery. But when you have a plan, you can focus on losing weight and living a healthier life. So what steps can you take to prepare for weight-loss surgery? Will you set some goals? Will you learn about how surgery can affect your life?  How about asking family or friends for help? Write out your plan. Then get ready. Where can you learn more? Go to http://madison-gissel.info/ Enter U408 in the search box to learn more about \"Learning About How to Prepare for Weight-Loss Surgery. \" Current as of: December 11, 2019               Content Version: 12.5 © 8755-7223 [employer]. Care instructions adapted under license by [employer] (which disclaims liability or warranty for this information). If you have questions about a medical condition or this instruction, always ask your healthcare professional. [address] any warranty or liability for your use of this information.

## 2020-08-12 NOTE — PROGRESS NOTES
Subjective: The patient is a 31 y.o. obese female seeking approval for laparoscopic revision of sleeve gastrectomy to gastric bypass for recurrent morbid obesity. Body mass index is 43.67 kg/m². Vanesa Benavidez has tried multiple diets in her  lifetime most recently trying unsupervised diets during which she was able to lose small amounts of weight during her supervised medical weight loss program.  Reflux symptoms persist despite Prilosec.     Bariatric comorbidities present are   Past Medical History:   Diagnosis Date    Asthma     AS CHILD    Bilateral knee pain 3/28/2012    Chronic foot pain 3/28/2012    Chronic headaches 3/28/2012    Heartburn 3/28/2012    Hypertension     Low back pain 3/28/2012    Morbid obesity ([facility] 3/28/2012    Snoring        Patient Active Problem List    Diagnosis Date Noted    Anemia, iron deficiency 2013    Vitamin D deficiency 2013    Morbid obesity ([facility] 2012    Hypertension 2012    Asthma 2012    Chronic headaches 2012    Snoring 2012    Low back pain 2012    Bilateral knee pain 2012    Chronic foot pain 2012    Heartburn 2012     Past Medical History:   Diagnosis Date    Asthma     AS CHILD    Bilateral knee pain 3/28/2012    Chronic foot pain 3/28/2012    Chronic headaches 3/28/2012    Heartburn 3/28/2012    Hypertension     Low back pain 3/28/2012    Morbid obesity ([facility] [address] 3/28/2012    Snoring       Past Surgical History:   Procedure Laterality Date    ABDOMEN SURGERY PROC UNLISTED      HX  SECTION  2019    HX CHOLECYSTECTOMY      laparoscopic  in     HX GASTRECTOMY  13    lap slleve gastrectomy by Dr Denisa Lloyd HX HEENT  2007    jaws wired shut and bone graft at [facility] by Dr Gracy Benitez  2009    humerous reconstruction after injury by Dr Kacy Varghese at [place] [address]    at [facility] Tobacco Use    Smoking status: Former Smoker     Packs/day: 0.50     Years: 7.00     Pack years: 3.50     Last attempt to quit: 2011     Years since quittin.8    Smokeless tobacco: Never Used   Substance Use Topics    Alcohol use: Yes     Alcohol/week: 1.7 standard drinks     Types: 2 Cans of beer per week     Comment: RARELY      Family History   Problem Relation Age of Onset    Obesity Mother    [facility] Migraines Mother     Hypertension Mother     Kidney Disease Brother     Cancer Maternal Grandmother         thyroid cancer    Heart Disease Maternal Grandfather     Hypertension Maternal Grandfather       Prior to Admission medications    Medication Sig Start Date End Date Taking? Authorizing Provider   levonorgestrel (MIRENA) 20 mcg/24 hours (5 yrs) 52 mg IUD 1 Device by IntraUTERine route once. Yes Provider, Historical   omeprazole (PRILOSEC) 20 mg capsule Take 20 mg by mouth daily. Yes Provider, Historical   prenatal vits w-Ca,Fe,FA,1 mg, (PRENATAL 1 + IRON PO) Take  by mouth. Yes Provider, Historical     Allergies   Allergen Reactions    Latex Rash    Bactrim [Sulfamethoxazole-Trimethoprim] Rash    Ceclor [Cefaclor] Rash    Erythromycin Rash    Keflex [Cephalexin] Rash    Morphine Nausea Only    Sulfa (Sulfonamide Antibiotics) Rash         Objective:     Visit Vitals  BP (!) 180/110   Pulse 97   Temp 98.1 °F (36.7 °C)   Resp 20   Ht 5' 7.5\" (1.715 m)   Wt 283 lb (128.4 kg)   SpO2 98%   BMI 43.67 kg/m²       Data Review: Upper gastrointestinal series: Spontaneous gastroesophageal reflux without hiatal hernia. Expected sleeve gastrectomy anatomy. Assessment:     Recurrent morbid obesity with worsening gastroesophageal reflux symptoms. No success with medical management. She has completed all preoperative prerequisites and has been found to be a good candidate for revisional bariatric surgery.     Plan:     Continue diet, structured exercise program.  We will submit for preauthorization for laparoscopic revision of sleeve gastrectomy to gastric bypass with upper endoscopy. She understands if preauthorization is obtained, her case will need to be presented at patient selection committee. 20 minutes spent with patient (greater than 50% of time in face-face consultation reviewing work-up, technical aspects of revision, expected hospital stay, postoperative diet, activity restrictions).       Signed By: Crow Riley MD     August 11, 2020

## 2020-09-15 ENCOUNTER — DOCUMENTATION ONLY (OUTPATIENT)
Dept: SURGERY | Age: 30
End: 2020-09-15

## 2020-09-15 DIAGNOSIS — K21.9 GASTROESOPHAGEAL REFLUX DISEASE WITHOUT ESOPHAGITIS: Primary | ICD-10-CM

## 2020-09-17 ENCOUNTER — HOSPITAL ENCOUNTER (OUTPATIENT)
Dept: GENERAL RADIOLOGY | Age: 30
Discharge: HOME | End: 2020-09-17
Attending: SURGERY
Payer: MEDICAID

## 2020-09-17 ENCOUNTER — HOSPITAL ENCOUNTER (OUTPATIENT)
Dept: PREADMISSION TESTING | Age: 30
Discharge: HOME | End: 2020-09-17
Payer: MEDICAID

## 2020-09-17 VITALS
RESPIRATION RATE: 20 BRPM | BODY MASS INDEX: 42.97 KG/M2 | HEIGHT: 68 IN | WEIGHT: 283.51 LBS | SYSTOLIC BLOOD PRESSURE: 127 MMHG | TEMPERATURE: 98 F | DIASTOLIC BLOOD PRESSURE: 80 MMHG | HEART RATE: 86 BPM

## 2020-09-17 LAB
ALBUMIN SERPL-MCNC: 3.9 G/DL (ref 3.5–5)
ALBUMIN/GLOB SERPL: 0.9 {RATIO} (ref 1.1–2.2)
ALP SERPL-CCNC: 72 U/L (ref 45–117)
ALT SERPL-CCNC: 133 U/L (ref 12–78)
ANION GAP SERPL CALC-SCNC: 7 MMOL/L (ref 5–15)
APPEARANCE UR: CLEAR
AST SERPL-CCNC: 144 U/L (ref 15–37)
ATRIAL RATE: 82 BPM
BACTERIA URNS QL MICRO: ABNORMAL /HPF
BASOPHILS # BLD: 0.1 K/UL (ref 0–0.1)
BASOPHILS NFR BLD: 1 % (ref 0–1)
BILIRUB SERPL-MCNC: 0.5 MG/DL (ref 0.2–1)
BILIRUB UR QL: NEGATIVE
BUN SERPL-MCNC: 11 MG/DL (ref 6–20)
BUN/CREAT SERPL: 12 (ref 12–20)
CALCIUM SERPL-MCNC: 9.6 MG/DL (ref 8.5–10.1)
CALCULATED P AXIS, ECG09: 29 DEGREES
CALCULATED R AXIS, ECG10: 20 DEGREES
CALCULATED T AXIS, ECG11: 30 DEGREES
CHLORIDE SERPL-SCNC: 100 MMOL/L (ref 97–108)
CO2 SERPL-SCNC: 29 MMOL/L (ref 21–32)
COLOR UR: ABNORMAL
CREAT SERPL-MCNC: 0.94 MG/DL (ref 0.55–1.02)
DIAGNOSIS, 93000: NORMAL
DIFFERENTIAL METHOD BLD: ABNORMAL
EOSINOPHIL # BLD: 0.1 K/UL (ref 0–0.4)
EOSINOPHIL NFR BLD: 1 % (ref 0–7)
EPITH CASTS URNS QL MICRO: ABNORMAL /LPF
ERYTHROCYTE [DISTWIDTH] IN BLOOD BY AUTOMATED COUNT: 16.1 % (ref 11.5–14.5)
GLOBULIN SER CALC-MCNC: 4.5 G/DL (ref 2–4)
GLUCOSE SERPL-MCNC: 116 MG/DL (ref 65–100)
GLUCOSE UR STRIP.AUTO-MCNC: NEGATIVE MG/DL
HCT VFR BLD AUTO: 42.8 % (ref 35–47)
HGB BLD-MCNC: 13.4 G/DL (ref 11.5–16)
HGB UR QL STRIP: ABNORMAL
HYALINE CASTS URNS QL MICRO: ABNORMAL /LPF (ref 0–5)
IMM GRANULOCYTES # BLD AUTO: 0 K/UL (ref 0–0.04)
IMM GRANULOCYTES NFR BLD AUTO: 0 % (ref 0–0.5)
KETONES UR QL STRIP.AUTO: NEGATIVE MG/DL
LEUKOCYTE ESTERASE UR QL STRIP.AUTO: ABNORMAL
LYMPHOCYTES # BLD: 1.9 K/UL (ref 0.8–3.5)
LYMPHOCYTES NFR BLD: 20 % (ref 12–49)
MAGNESIUM SERPL-MCNC: 2.1 MG/DL (ref 1.6–2.4)
MCH RBC QN AUTO: 28.5 PG (ref 26–34)
MCHC RBC AUTO-ENTMCNC: 31.3 G/DL (ref 30–36.5)
MCV RBC AUTO: 91.1 FL (ref 80–99)
MONOCYTES # BLD: 0.5 K/UL (ref 0–1)
MONOCYTES NFR BLD: 5 % (ref 5–13)
NEUTS SEG # BLD: 7 K/UL (ref 1.8–8)
NEUTS SEG NFR BLD: 73 % (ref 32–75)
NITRITE UR QL STRIP.AUTO: NEGATIVE
NRBC # BLD: 0 K/UL (ref 0–0.01)
NRBC BLD-RTO: 0 PER 100 WBC
P-R INTERVAL, ECG05: 148 MS
PH UR STRIP: 6.5 [PH] (ref 5–8)
PLATELET # BLD AUTO: 412 K/UL (ref 150–400)
PMV BLD AUTO: 10.3 FL (ref 8.9–12.9)
POTASSIUM SERPL-SCNC: 3.8 MMOL/L (ref 3.5–5.1)
PROT SERPL-MCNC: 8.4 G/DL (ref 6.4–8.2)
PROT UR STRIP-MCNC: NEGATIVE MG/DL
Q-T INTERVAL, ECG07: 368 MS
QRS DURATION, ECG06: 102 MS
QTC CALCULATION (BEZET), ECG08: 429 MS
RBC # BLD AUTO: 4.7 M/UL (ref 3.8–5.2)
RBC #/AREA URNS HPF: ABNORMAL /HPF (ref 0–5)
SODIUM SERPL-SCNC: 136 MMOL/L (ref 136–145)
SP GR UR REFRACTOMETRY: 1.02 (ref 1–1.03)
UA: UC IF INDICATED,UAUC: ABNORMAL
UROBILINOGEN UR QL STRIP.AUTO: 0.2 EU/DL (ref 0.2–1)
VENTRICULAR RATE, ECG03: 82 BPM
WBC # BLD AUTO: 9.6 K/UL (ref 3.6–11)
WBC URNS QL MICRO: ABNORMAL /HPF (ref 0–4)

## 2020-09-17 PROCEDURE — 83735 ASSAY OF MAGNESIUM: CPT

## 2020-09-17 PROCEDURE — 81001 URINALYSIS AUTO W/SCOPE: CPT

## 2020-09-17 PROCEDURE — 85025 COMPLETE CBC W/AUTO DIFF WBC: CPT

## 2020-09-17 PROCEDURE — 71046 X-RAY EXAM CHEST 2 VIEWS: CPT

## 2020-09-17 PROCEDURE — 80053 COMPREHEN METABOLIC PANEL: CPT

## 2020-09-17 PROCEDURE — 93005 ELECTROCARDIOGRAM TRACING: CPT

## 2020-09-17 PROCEDURE — 36415 COLL VENOUS BLD VENIPUNCTURE: CPT

## 2020-09-17 RX ORDER — CHLORTHALIDONE 25 MG/1
25 TABLET ORAL DAILY
COMMUNITY
End: 2020-10-23

## 2020-09-17 RX ORDER — LISINOPRIL 20 MG/1
20 TABLET ORAL DAILY
COMMUNITY

## 2020-09-18 ENCOUNTER — DOCUMENTATION ONLY (OUTPATIENT)
Dept: SURGERY | Age: 30
End: 2020-09-18

## 2020-09-18 NOTE — PROGRESS NOTES
ABNORMAL LABS ARE AS FOLLOWS:     ALT - 133   AST - 144   PLATELET - 400   RDW - 16.1     THANK YOU,     Rena Mari Dr Lovett Gowers made aware of labs.

## 2020-09-29 ENCOUNTER — OFFICE VISIT (OUTPATIENT)
Dept: SURGERY | Age: 30
End: 2020-09-29
Payer: MEDICAID

## 2020-09-29 VITALS
HEIGHT: 68 IN | WEIGHT: 277 LBS | SYSTOLIC BLOOD PRESSURE: 119 MMHG | TEMPERATURE: 98.5 F | DIASTOLIC BLOOD PRESSURE: 86 MMHG | RESPIRATION RATE: 18 BRPM | HEART RATE: 85 BPM | OXYGEN SATURATION: 97 % | BODY MASS INDEX: 41.98 KG/M2

## 2020-09-29 DIAGNOSIS — K76.0 FATTY LIVER: ICD-10-CM

## 2020-09-29 DIAGNOSIS — I10 ESSENTIAL HYPERTENSION: ICD-10-CM

## 2020-09-29 DIAGNOSIS — G89.18 POST-OP PAIN: ICD-10-CM

## 2020-09-29 DIAGNOSIS — K21.9 GASTROESOPHAGEAL REFLUX DISEASE, ESOPHAGITIS PRESENCE NOT SPECIFIED: ICD-10-CM

## 2020-09-29 DIAGNOSIS — E66.01 MORBID OBESITY (HCC): Primary | ICD-10-CM

## 2020-09-29 DIAGNOSIS — J45.20 MILD INTERMITTENT ASTHMA WITHOUT COMPLICATION: ICD-10-CM

## 2020-09-29 PROCEDURE — 90000 NO LOS: CPT | Performed by: SURGERY

## 2020-09-29 RX ORDER — POLYETHYLENE GLYCOL 3350 17 G/17G
17 POWDER, FOR SOLUTION ORAL DAILY
Qty: 30 PACKET | Refills: 3 | Status: SHIPPED | OUTPATIENT
Start: 2020-09-29

## 2020-09-29 RX ORDER — ONDANSETRON 4 MG/1
4 TABLET, ORALLY DISINTEGRATING ORAL
Qty: 20 TAB | Refills: 0 | Status: SHIPPED | OUTPATIENT
Start: 2020-09-29

## 2020-09-29 RX ORDER — OMEPRAZOLE 20 MG/1
20 CAPSULE, DELAYED RELEASE ORAL DAILY
Qty: 30 CAP | Refills: 1 | Status: SHIPPED | OUTPATIENT
Start: 2020-09-29

## 2020-09-29 RX ORDER — GABAPENTIN 100 MG/1
100-200 CAPSULE ORAL
Qty: 30 CAP | Refills: 0 | Status: SHIPPED | OUTPATIENT
Start: 2020-09-29 | End: 2020-10-04

## 2020-09-29 NOTE — PATIENT INSTRUCTIONS
Vitamins:  
 
For an \"all in one\" bariatric supplement go to www. [employer]. com and look for the Opurity Multivitamins with iron for gastric bypass. You can take 2 capsules or 1 chewable and get what you need for a reasonable cost about $35 for a 3 months supply Start the pre op diet You are required to be tested for COVID-19 prior to surgery. Testing must be done at [facility] 96 hours prior to surgery. You will be contacted by preadmission testing for a date and time. Failure to test and or a positive test will result in cancellation of surgery. Day Before Surgery: 
 -  take (2) Extra Strength Tylenol (acetaminophen) at noon and 8 pm  
 -  you may drink sugar free clear liquids until 3 hours prior to surgery  your after surgery prescriptions BEFORE surgery Make your 2, 4 and 6 week appointments for after surgery Sign up for My Chart

## 2020-09-29 NOTE — PROGRESS NOTES
Subjective: The patient is a 31 y.o. obese female scheduled for laparoscopic revision of sleeve gastrectomy to gastric bypass. Body mass index is 42.12 kg/m². Edgard Chung has tried multiple diets in her  lifetime most recently trying unsupervised diets during which she has lost weight.   Reflux symptoms persist.    Bariatric comorbidities present are   Past Medical History:   Diagnosis Date    Asthma     AS CHILD    Bilateral knee pain 3/28/2012    Chronic foot pain 3/28/2012    Chronic headaches 3/28/2012    GERD (gastroesophageal reflux disease)     Heartburn 3/28/2012    Hypertension     Low back pain 3/28/2012    Morbid obesity ([address] 3/28/2012    Snoring        Patient Active Problem List    Diagnosis Date Noted    Anemia, iron deficiency 2013    Vitamin D deficiency 2013    Morbid obesity ([address] 2012    Hypertension 2012    Asthma 2012    Chronic headaches 2012    Snoring 2012    Low back pain 2012    Bilateral knee pain 2012    Chronic foot pain 2012    Heartburn 2012     Past Medical History:   Diagnosis Date    Asthma     AS CHILD    Bilateral knee pain 3/28/2012    Chronic foot pain 3/28/2012    Chronic headaches 3/28/2012    GERD (gastroesophageal reflux disease)     Heartburn 3/28/2012    Hypertension     Low back pain 3/28/2012    Morbid obesity ([address] 3/28/2012    Snoring       Past Surgical History:   Procedure Laterality Date    ABDOMEN SURGERY PROC UNLISTED      HX  SECTION      HX GASTRECTOMY  13    lap slleve gastrectomy by Dr Sydnie Glover      LAP SLEEVE GASTRECTOMY    HX HEENT  2007    jaws wired shut and bone graft at [facility] by Dr Violette Mora      HX ORTHOPAEDIC  2009    humerous reconstruction after injury by Dr Yesi Hawkins at [place] [address]    at [facility] Tobacco Use    Smoking status: Former Smoker     Packs/day: 0.50     Years: 7.00     Pack years: 3.50     Last attempt to quit: 2011     Years since quittin.0    Smokeless tobacco: Never Used   Substance Use Topics    Alcohol use: Yes     Frequency: 2-4 times a month     Comment: OCCASIONALLY      Family History   Problem Relation Age of Onset    Obesity Mother    [facility] Migraines Mother     Hypertension Mother     No Known Problems Brother     Cancer Maternal Grandmother         thyroid cancer    Heart Disease Maternal Grandfather     Hypertension Maternal Grandfather     No Known Problems Father     Anesth Problems Neg Hx       Prior to Admission medications    Medication Sig Start Date End Date Taking? Authorizing Provider   ondansetron (ZOFRAN ODT) 4 mg disintegrating tablet Take 1 Tab by mouth every eight (8) hours as needed for Nausea or Nausea or Vomiting (after surgery). 20  Yes Ramos Mckeon NP   gabapentin (NEURONTIN) 100 mg capsule Take 1-2 Caps by mouth every eight (8) hours as needed for Pain (after surgery) for up to 5 days. Max Daily Amount: 600 mg. 9/29/20 10/4/20 Yes Ramos Mckeon NP   polyethylene glycol (MIRALAX) 17 gram packet Take 1 Packet by mouth daily. Indications: constipation 20  Yes Ramos Mckeon NP   omeprazole (PRILOSEC) 20 mg capsule Take 1 Cap by mouth daily. Take every day after surgery 20  Yes Ramos Mckeon NP   lisinopriL (PRINIVIL, ZESTRIL) 20 mg tablet Take 20 mg by mouth daily. Yes Provider, Historical   chlorthalidone (HYGROTEN) 25 mg tablet Take 25 mg by mouth daily. Yes Provider, Historical   levonorgestrel (MIRENA) 20 mcg/24 hours (5 yrs) 52 mg IUD 1 Device by IntraUTERine route once.    Yes Provider, Historical     Allergies   Allergen Reactions    Latex Rash    Bactrim [Sulfamethoxazole-Trimethoprim] Rash    Ceclor [Cefaclor] Rash    Erythromycin Rash    Keflex [Cephalexin] Rash    Morphine Nausea Only    Sulfa (Sulfonamide Antibiotics) Rash         Objective:     Visit Vitals  /86 (BP 1 Location: Left arm, BP Patient Position: Sitting)   Pulse 85   Temp 98.5 °F (36.9 °C) (Oral)   Resp 18   Ht 5' 8\" (1.727 m)   Wt 277 lb (125.6 kg)   LMP 09/10/2020 (Approximate)   SpO2 97%   BMI 42.12 kg/m²       Data Review: Upper gastrointestinal series: Spontaneous reflux; expected sleeve morphology without hiatal hernia. Assessment:     Persistent morbid obesity with comorbidities. No success with medical management. Plan:     Laparoscopic revision of sleeve gastrectomy to gastric bypass. Technical aspects of procedure reviewed along with risks (to include but not limited to bleeding, wound infection, VTE, open procedure, leak, ulcer, persistent reflux symptoms, dysphagia, poor weight loss/weight regain; also discussed that revisional procedures are associated with 3 times higher risk of all complications compared to primary procedure). Also reviewed anticipated hospital course, expected results, postoperative diet, and activity restrictions. She agrees and desires to proceed. All questions answered. 25 minutes spent with patient (greater than 50% of time in face-face consultation reviewing potential risks, anticipated hospital course, postoperative diet, activity restrictions).       Signed By: Bogdan Landin MD     September 29, 2020

## 2020-09-29 NOTE — PROGRESS NOTES
1. Have you been to the ER, urgent care clinic since your last visit? Hospitalized since your last visit? No    2. Have you seen or consulted any other health care providers outside of the [address] [place] [address] since your last visit? Include any pap smears or colon screening.  No

## 2020-09-29 NOTE — PROGRESS NOTES
HISTORY OF PRESENT ILLNESS  Missy Cochran is a 31 y.o. female.   HPI   Chief Complaint   Patient presents with    Surg H&P     Patient Active Problem List   Diagnosis Code    Morbid obesity ([facility] [address] E66.01    Hypertension I10    Asthma J45.909    Chronic headaches R51    Snoring R06.83    Heartburn R12    Vitamin D deficiency E55.9     Past Medical History:   Diagnosis Date    Asthma     AS CHILD    Bilateral knee pain 3/28/2012    Chronic foot pain 3/28/2012    Chronic headaches 3/28/2012    GERD (gastroesophageal reflux disease)     Heartburn 3/28/2012    Hypertension     Low back pain 3/28/2012    Morbid obesity ([facility] [address] 3/28/2012    Snoring      Past Surgical History:   Procedure Laterality Date    ABDOMEN SURGERY PROC UNLISTED      HX  SECTION      HX GASTRECTOMY  13    lap slleve gastrectomy by Dr Teri Camacho      LAP SLEEVE GASTRECTOMY    HX HEENT  2007    jaws wired shut [place] [facility] [place] Paul Pope      HX ORTHOPAEDIC  2009    humerous reconstruction after injury by Dr Hebert Pompa at [place] [address]    at [address] Marital status:      Spouse name: Not on file    Number of children: Not on file    Years of education: Not on file    Highest education level: Not on file   Occupational History    Occupation: homemaker    Social Needs    Financial resource strain: Not on file    Food insecurity     Worry: Not on file     Inability: Not on file   [employer] needs     Medical: Not on file     Non-medical: Not on file   Tobacco Use    Smoking status: Former Smoker     Packs/day: 0.50     Years: 7.00     Pack years: 3.50     Last attempt to quit: 2011     Years since quittin.0    Smokeless tobacco: Never Used   Substance and Sexual Activity    Alcohol use: Yes     Frequency: 2-4 times a month     Comment: OCCASIONALLY    Drug use: No    Sexual activity: Yes     Partners: Male     Birth control/protection: I.U.D. Lifestyle    Physical activity     Days per week: Not on file     Minutes per session: Not on file    Stress: Not on file   Relationships    Social connections     Talks on phone: Not on file     Gets together: Not on file     Attends [employer] service: Not on file     Active member of club or organization: Not on file     Attends meetings of clubs or organizations: Not on file     Relationship status: Not on file    Intimate partner violence     Fear of current or ex partner: Not on file     Emotionally abused: Not on file     Physically abused: Not on file     Forced sexual activity: Not on file   Other Topics Concern    Not on file   Social History Narrative    In the home with spouse and 3 children ages 8, 1 and       Family History   Problem Relation Age of Onset    Obesity Mother     Migraines Mother     Hypertension Mother     No Known Problems Brother     Cancer Maternal Grandmother         thyroid cancer    Heart Disease Maternal Grandfather     Hypertension Maternal Grandfather     No Known Problems Father     Anesth Problems Neg Hx      Allergies   Allergen Reactions    Latex Rash    Bactrim [Sulfamethoxazole-Trimethoprim] Rash    Ceclor [Cefaclor] Rash    Erythromycin Rash    Keflex [Cephalexin] Rash    Morphine Nausea Only    Sulfa (Sulfonamide Antibiotics) Rash         Review of Systems   HENT: Negative for congestion, hearing loss, sore throat and tinnitus. Eyes: Positive for blurred vision (supposed to wear glasses). Respiratory: Positive for shortness of breath (if walks fast ). Negative for cough, sputum production and wheezing. Cardiovascular: Negative for chest pain and leg swelling. No hx DVT and no FH of clotting disorder    Gastrointestinal: Positive for diarrhea (since gallbladder ) and heartburn (better with PPI ).  Negative for abdominal pain, blood in stool, constipation, nausea and vomiting. Genitourinary: Positive for urgency (stress incontinence ). Negative for dysuria, flank pain, frequency and hematuria. Musculoskeletal: Negative for back pain, falls and myalgias. Skin: Negative. Neurological: Positive for headaches (all the time, better with BP meds ). Negative for tingling and seizures. Endo/Heme/Allergies: Does not bruise/bleed easily. Psychiatric/Behavioral:        Sleep poor with kids        Physical Exam  Constitutional:       Appearance: She is obese. Comments: /86 (BP 1 Location: Left arm, BP Patient Position: Sitting)   Pulse 85   Temp 98.5 °F (36.9 °C) (Oral)   Resp 18   Ht 5' 8\" (1.727 m)   Wt 277 lb (125.6 kg)   LMP 09/10/2020 (Approximate)   SpO2 97%   BMI 42.12 kg/m²   White female with obesity      HENT:      Head: Normocephalic and atraumatic. Mouth/Throat:      Mouth: Mucous membranes are moist.      Pharynx: Oropharynx is clear. Eyes:      Extraocular Movements: Extraocular movements intact. Conjunctiva/sclera: Conjunctivae normal.      Pupils: Pupils are equal, round, and reactive to light. Cardiovascular:      Rate and Rhythm: Normal rate and regular rhythm. Pulmonary:      Effort: Pulmonary effort is normal.      Breath sounds: Normal breath sounds. Abdominal:      Palpations: Abdomen is soft. Comments: Obese     Musculoskeletal: Normal range of motion. Comments: Ambulating independently    Skin:     General: Skin is warm and dry. Neurological:      General: No focal deficit present. Mental Status: She is alert and oriented to person, place, and time. Psychiatric:         Mood and Affect: Mood normal.         Behavior: Behavior normal.         Thought Content: Thought content normal.         ASSESSMENT and PLAN    ICD-10-CM ICD-9-CM    1. Morbid obesity ([facility]  E66.01 278.01    2. Essential hypertension  I10 401.9    3.  Gastroesophageal reflux disease, esophagitis presence not specified  K21.9 530.81 omeprazole (PRILOSEC) 20 mg capsule   4. Mild intermittent asthma without complication  J46.90 581.85    5. Fatty liver  K76.0 571.8    6. BMI 40.0-44.9, adult ([facility]  Z68.41 V85.41    7. Post-op pain  G89.18 338.18 gabapentin (NEURONTIN) 100 mg capsule     1. Morbid obesity:  Scheduled for revision sleeve to Malabsorptive gastric bypass for treatment of morbid obesity and GERD on 10/21/20 with Dr. Dodie Wallace MD     [facility] protocol reviewed:  Acetaminophen 1000 mg at noon and 8 pm day before surgery and may have clear liquids (no caffeine, no ETOH, no carbonation and calorie free) until 3 hours prior to surgery   Preadmission testing results reviewed face to face with patient / pending   Post operative prescriptions sent to pharmacy on file for AFTER surgery:  Omeprazole 20 mg every day x 30 days, then reassess need;  Zofran 4 mg ODT #20 no refills for post op nausea; Miralax 1 pckt every day    Post op pain management:  Gabapentin 100-300 mg q 8 hours prn pain x 5 days; acetaminophen 1000 mg po q 8 hours prn; abdominal support / splinting; heating pad prn   Started on liver shrinking diet and Bariatric vitamins   Reviewed post operative diet, restrictions, follow up and medications  Post operative 2, 4 and 6  week appointments made  Reviewed educational materials and book    2. HTN continue with antihypertensives and monitor   3. GERD PPI every day and reassess after 30 days   4. Fatty liver with elevated LFTs - consider liver biopsy at the time of surgery     Delphine Francois verbalized understanding and questions were answered to the best of my knowledge and ability. Surgery and COVID  educational materials were provided.     27 minutes spent in face to face with patient

## 2020-10-06 ENCOUNTER — TELEPHONE (OUTPATIENT)
Dept: SURGERY | Age: 30
End: 2020-10-06

## 2020-10-06 NOTE — TELEPHONE ENCOUNTER
Returned call to patient. Two patient identifiers used. Patient wanted to know if she could eat nuts and salad dressing. Explained to patient that she could not have nuts and salad dressing according to the Liver Shrinking instructions in her book, which I confirmed she had a copy of. I gave the patient the page numbers to refer back to. Patient expressed understanding of the above.

## 2020-10-06 NOTE — TELEPHONE ENCOUNTER
Patient wanted to know if it was okay to eat nuts and wondering if she was allowed to have salad dressing on the liver shrinking diet.

## 2020-10-13 ENCOUNTER — TRANSCRIBE ORDER (OUTPATIENT)
Dept: REGISTRATION | Age: 30
End: 2020-10-13

## 2020-10-13 DIAGNOSIS — Z01.812 PRE-PROCEDURE LAB EXAM: Primary | ICD-10-CM

## 2020-10-17 ENCOUNTER — HOSPITAL ENCOUNTER (OUTPATIENT)
Dept: PREADMISSION TESTING | Age: 30
Discharge: HOME | End: 2020-10-17
Payer: MEDICAID

## 2020-10-17 DIAGNOSIS — Z01.812 PRE-PROCEDURE LAB EXAM: ICD-10-CM

## 2020-10-17 PROCEDURE — 87635 SARS-COV-2 COVID-19 AMP PRB: CPT

## 2020-10-18 LAB — SARS-COV-2, COV2NT: NOT DETECTED

## 2020-10-20 ENCOUNTER — ANESTHESIA EVENT (OUTPATIENT)
Dept: SURGERY | Age: 30
DRG: 403 | End: 2020-10-20
Payer: MEDICAID

## 2020-10-21 ENCOUNTER — HOSPITAL ENCOUNTER (INPATIENT)
Age: 30
LOS: 2 days | Discharge: HOME | DRG: 403 | End: 2020-10-23
Attending: SURGERY | Admitting: SURGERY
Payer: MEDICAID

## 2020-10-21 ENCOUNTER — ANESTHESIA (OUTPATIENT)
Dept: SURGERY | Age: 30
DRG: 403 | End: 2020-10-21
Payer: MEDICAID

## 2020-10-21 DIAGNOSIS — Z98.84 S/P GASTRIC BYPASS: Primary | ICD-10-CM

## 2020-10-21 LAB — HCG UR QL: NEGATIVE

## 2020-10-21 PROCEDURE — 0DJ08ZZ INSPECTION OF UPPER INTESTINAL TRACT, VIA NATURAL OR ARTIFICIAL OPENING ENDOSCOPIC: ICD-10-PCS | Performed by: SURGERY

## 2020-10-21 PROCEDURE — 77030014090 HC TRCR OPT AMR -B: Performed by: SURGERY

## 2020-10-21 PROCEDURE — 77030010286 HC STPLR ENDOSC COVD -D: Performed by: SURGERY

## 2020-10-21 PROCEDURE — 0DB64Z3 EXCISION OF STOMACH, PERCUTANEOUS ENDOSCOPIC APPROACH, VERTICAL: ICD-10-PCS | Performed by: SURGERY

## 2020-10-21 PROCEDURE — 74011250636 HC RX REV CODE- 250/636: Performed by: SURGERY

## 2020-10-21 PROCEDURE — 77030002916 HC SUT ETHLN J&J -A: Performed by: SURGERY

## 2020-10-21 PROCEDURE — 77030040506 HC DRN WND MDII -A: Performed by: SURGERY

## 2020-10-21 PROCEDURE — 77030002933 HC SUT MCRYL J&J -A: Performed by: SURGERY

## 2020-10-21 PROCEDURE — 74011250637 HC RX REV CODE- 250/637: Performed by: SURGERY

## 2020-10-21 PROCEDURE — 77030008437 HC REINF STRP REINF SEMGD WLGO -C: Performed by: SURGERY

## 2020-10-21 PROCEDURE — 74011250636 HC RX REV CODE- 250/636: Performed by: NURSE ANESTHETIST, CERTIFIED REGISTERED

## 2020-10-21 PROCEDURE — 74011000250 HC RX REV CODE- 250: Performed by: SURGERY

## 2020-10-21 PROCEDURE — 77030040922 HC BLNKT HYPOTHRM STRY -A

## 2020-10-21 PROCEDURE — 77030034667 HC ACC PLATFRM ENDO GELPNT AMR -E: Performed by: SURGERY

## 2020-10-21 PROCEDURE — 74011000250 HC RX REV CODE- 250: Performed by: NURSE ANESTHETIST, CERTIFIED REGISTERED

## 2020-10-21 PROCEDURE — 77030034821 HC DEV ENDOSC DST ORVIL COVD -C: Performed by: SURGERY

## 2020-10-21 PROCEDURE — 77030008023 HC RELD SUT ENDOSC COVD -B: Performed by: SURGERY

## 2020-10-21 PROCEDURE — 77030020263 HC SOL INJ SOD CL0.9% LFCR 1000ML: Performed by: SURGERY

## 2020-10-21 PROCEDURE — 76010000131 HC OR TIME 2 TO 2.5 HR: Performed by: SURGERY

## 2020-10-21 PROCEDURE — 77030009956 HC RELD ENDOSTCH COVD -B: Performed by: SURGERY

## 2020-10-21 PROCEDURE — 74011000258 HC RX REV CODE- 258: Performed by: SURGERY

## 2020-10-21 PROCEDURE — 0D164ZA BYPASS STOMACH TO JEJUNUM, PERCUTANEOUS ENDOSCOPIC APPROACH: ICD-10-PCS | Performed by: SURGERY

## 2020-10-21 PROCEDURE — 77030037032 HC INSRT SCIS CLICKLLINE DISP STOR -B: Performed by: SURGERY

## 2020-10-21 PROCEDURE — 77030012770 HC TRCR OPT FX AMR -B: Performed by: SURGERY

## 2020-10-21 PROCEDURE — 74011250636 HC RX REV CODE- 250/636: Performed by: ANESTHESIOLOGY

## 2020-10-21 PROCEDURE — 77030008756 HC TU IRR SUC STRY -B: Performed by: SURGERY

## 2020-10-21 PROCEDURE — 81025 URINE PREGNANCY TEST: CPT

## 2020-10-21 PROCEDURE — 77030020053 HC ELECTRD LAPSCP COVD -B: Performed by: SURGERY

## 2020-10-21 PROCEDURE — 77030013079 HC BLNKT BAIR HGGR 3M -A: Performed by: ANESTHESIOLOGY

## 2020-10-21 PROCEDURE — 77030009957 HC RELD ENDOSTCH COVD -C: Performed by: SURGERY

## 2020-10-21 PROCEDURE — 77030008684 HC TU ET CUF COVD -B: Performed by: ANESTHESIOLOGY

## 2020-10-21 PROCEDURE — 77030020829: Performed by: SURGERY

## 2020-10-21 PROCEDURE — 77030008608 HC TRCR ENDOSC SMTH AMR -B: Performed by: SURGERY

## 2020-10-21 PROCEDURE — 77030040956 HC DSCTR SONICISION MEDT -I: Performed by: SURGERY

## 2020-10-21 PROCEDURE — 77030038157 HC DEV PWR CNTR DISP SIGNIA COVD -C: Performed by: SURGERY

## 2020-10-21 PROCEDURE — 88300 SURGICAL PATH GROSS: CPT

## 2020-10-21 PROCEDURE — 2709999900 HC NON-CHARGEABLE SUPPLY: Performed by: SURGERY

## 2020-10-21 PROCEDURE — 77030031139 HC SUT VCRL2 J&J -A: Performed by: SURGERY

## 2020-10-21 PROCEDURE — 77030008438 HC STPL REINF SEMGD WLGO -D: Performed by: SURGERY

## 2020-10-21 PROCEDURE — 77030010507 HC ADH SKN DERMBND J&J -B: Performed by: SURGERY

## 2020-10-21 PROCEDURE — 65660000000 HC RM CCU STEPDOWN

## 2020-10-21 PROCEDURE — 77030007955 HC PCH ENDOSC SPEC J&J -B: Performed by: SURGERY

## 2020-10-21 PROCEDURE — 77030009965 HC RELD STPLR ENDOS COVD -D: Performed by: SURGERY

## 2020-10-21 PROCEDURE — 76210000006 HC OR PH I REC 0.5 TO 1 HR: Performed by: SURGERY

## 2020-10-21 PROCEDURE — 76060000036 HC ANESTHESIA 2.5 TO 3 HR: Performed by: SURGERY

## 2020-10-21 PROCEDURE — 93005 ELECTROCARDIOGRAM TRACING: CPT

## 2020-10-21 PROCEDURE — 77030026438 HC STYL ET INTUB CARD -A: Performed by: ANESTHESIOLOGY

## 2020-10-21 PROCEDURE — 77030016151 HC PROTCTR LNS DFOG COVD -B: Performed by: SURGERY

## 2020-10-21 PROCEDURE — 77030040361 HC SLV COMPR DVT MDII -B: Performed by: SURGERY

## 2020-10-21 RX ORDER — METRONIDAZOLE 500 MG/100ML
500 INJECTION, SOLUTION INTRAVENOUS
Status: DISCONTINUED | OUTPATIENT
Start: 2020-10-21 | End: 2020-10-21 | Stop reason: HOSPADM

## 2020-10-21 RX ORDER — SCOLOPAMINE TRANSDERMAL SYSTEM 1 MG/1
1 PATCH, EXTENDED RELEASE TRANSDERMAL ONCE
Status: DISCONTINUED | OUTPATIENT
Start: 2020-10-21 | End: 2020-10-23 | Stop reason: HOSPADM

## 2020-10-21 RX ORDER — HYOSCYAMINE SULFATE 0.12 MG/1
0.12 TABLET SUBLINGUAL
Status: DISCONTINUED | OUTPATIENT
Start: 2020-10-21 | End: 2020-10-23 | Stop reason: HOSPADM

## 2020-10-21 RX ORDER — ACETAMINOPHEN 325 MG/1
650 TABLET ORAL ONCE
Status: DISCONTINUED | OUTPATIENT
Start: 2020-10-21 | End: 2020-10-21 | Stop reason: HOSPADM

## 2020-10-21 RX ORDER — LIDOCAINE HYDROCHLORIDE ANHYDROUS AND DEXTROSE MONOHYDRATE .8; 5 G/100ML; G/100ML
INJECTION, SOLUTION INTRAVENOUS
Status: DISCONTINUED | OUTPATIENT
Start: 2020-10-21 | End: 2020-10-21 | Stop reason: HOSPADM

## 2020-10-21 RX ORDER — SODIUM CHLORIDE 0.9 % (FLUSH) 0.9 %
5-40 SYRINGE (ML) INJECTION EVERY 8 HOURS
Status: DISCONTINUED | OUTPATIENT
Start: 2020-10-21 | End: 2020-10-21 | Stop reason: HOSPADM

## 2020-10-21 RX ORDER — GABAPENTIN 100 MG/1
200 CAPSULE ORAL 2 TIMES DAILY
Status: DISCONTINUED | OUTPATIENT
Start: 2020-10-21 | End: 2020-10-23 | Stop reason: HOSPADM

## 2020-10-21 RX ORDER — SUCCINYLCHOLINE CHLORIDE 20 MG/ML
INJECTION INTRAMUSCULAR; INTRAVENOUS AS NEEDED
Status: DISCONTINUED | OUTPATIENT
Start: 2020-10-21 | End: 2020-10-21 | Stop reason: HOSPADM

## 2020-10-21 RX ORDER — FENTANYL CITRATE 50 UG/ML
25 INJECTION, SOLUTION INTRAMUSCULAR; INTRAVENOUS
Status: DISCONTINUED | OUTPATIENT
Start: 2020-10-21 | End: 2020-10-21 | Stop reason: HOSPADM

## 2020-10-21 RX ORDER — OXYCODONE AND ACETAMINOPHEN 5; 325 MG/1; MG/1
1 TABLET ORAL AS NEEDED
Status: DISCONTINUED | OUTPATIENT
Start: 2020-10-21 | End: 2020-10-21 | Stop reason: HOSPADM

## 2020-10-21 RX ORDER — MIDAZOLAM HYDROCHLORIDE 1 MG/ML
0.5 INJECTION, SOLUTION INTRAMUSCULAR; INTRAVENOUS
Status: DISCONTINUED | OUTPATIENT
Start: 2020-10-21 | End: 2020-10-21 | Stop reason: HOSPADM

## 2020-10-21 RX ORDER — ROCURONIUM BROMIDE 10 MG/ML
INJECTION, SOLUTION INTRAVENOUS AS NEEDED
Status: DISCONTINUED | OUTPATIENT
Start: 2020-10-21 | End: 2020-10-21 | Stop reason: HOSPADM

## 2020-10-21 RX ORDER — PHENYLEPHRINE HCL IN 0.9% NACL 0.4MG/10ML
SYRINGE (ML) INTRAVENOUS AS NEEDED
Status: DISCONTINUED | OUTPATIENT
Start: 2020-10-21 | End: 2020-10-21 | Stop reason: HOSPADM

## 2020-10-21 RX ORDER — ACETAMINOPHEN 500 MG
1000 TABLET ORAL EVERY 6 HOURS
Status: DISCONTINUED | OUTPATIENT
Start: 2020-10-21 | End: 2020-10-23 | Stop reason: HOSPADM

## 2020-10-21 RX ORDER — ONDANSETRON 2 MG/ML
4 INJECTION INTRAMUSCULAR; INTRAVENOUS
Status: DISCONTINUED | OUTPATIENT
Start: 2020-10-21 | End: 2020-10-23 | Stop reason: HOSPADM

## 2020-10-21 RX ORDER — HYDROMORPHONE HYDROCHLORIDE 1 MG/ML
0.5 INJECTION, SOLUTION INTRAMUSCULAR; INTRAVENOUS; SUBCUTANEOUS
Status: DISCONTINUED | OUTPATIENT
Start: 2020-10-21 | End: 2020-10-23 | Stop reason: HOSPADM

## 2020-10-21 RX ORDER — SODIUM CHLORIDE 0.9 % (FLUSH) 0.9 %
5-40 SYRINGE (ML) INJECTION AS NEEDED
Status: DISCONTINUED | OUTPATIENT
Start: 2020-10-21 | End: 2020-10-23 | Stop reason: HOSPADM

## 2020-10-21 RX ORDER — DEXAMETHASONE SODIUM PHOSPHATE 4 MG/ML
INJECTION, SOLUTION INTRA-ARTICULAR; INTRALESIONAL; INTRAMUSCULAR; INTRAVENOUS; SOFT TISSUE AS NEEDED
Status: DISCONTINUED | OUTPATIENT
Start: 2020-10-21 | End: 2020-10-21 | Stop reason: HOSPADM

## 2020-10-21 RX ORDER — SODIUM CHLORIDE, SODIUM LACTATE, POTASSIUM CHLORIDE, CALCIUM CHLORIDE 600; 310; 30; 20 MG/100ML; MG/100ML; MG/100ML; MG/100ML
125 INJECTION, SOLUTION INTRAVENOUS CONTINUOUS
Status: DISCONTINUED | OUTPATIENT
Start: 2020-10-21 | End: 2020-10-23 | Stop reason: HOSPADM

## 2020-10-21 RX ORDER — LIDOCAINE HYDROCHLORIDE 10 MG/ML
0.1 INJECTION, SOLUTION EPIDURAL; INFILTRATION; INTRACAUDAL; PERINEURAL AS NEEDED
Status: DISCONTINUED | OUTPATIENT
Start: 2020-10-21 | End: 2020-10-21 | Stop reason: HOSPADM

## 2020-10-21 RX ORDER — ONDANSETRON 2 MG/ML
INJECTION INTRAMUSCULAR; INTRAVENOUS AS NEEDED
Status: DISCONTINUED | OUTPATIENT
Start: 2020-10-21 | End: 2020-10-21 | Stop reason: HOSPADM

## 2020-10-21 RX ORDER — SODIUM CHLORIDE 0.9 % (FLUSH) 0.9 %
5-40 SYRINGE (ML) INJECTION AS NEEDED
Status: DISCONTINUED | OUTPATIENT
Start: 2020-10-21 | End: 2020-10-21 | Stop reason: HOSPADM

## 2020-10-21 RX ORDER — MORPHINE SULFATE 10 MG/ML
2 INJECTION, SOLUTION INTRAMUSCULAR; INTRAVENOUS
Status: DISCONTINUED | OUTPATIENT
Start: 2020-10-21 | End: 2020-10-21 | Stop reason: HOSPADM

## 2020-10-21 RX ORDER — DIPHENHYDRAMINE HYDROCHLORIDE 50 MG/ML
12.5 INJECTION, SOLUTION INTRAMUSCULAR; INTRAVENOUS AS NEEDED
Status: DISCONTINUED | OUTPATIENT
Start: 2020-10-21 | End: 2020-10-21 | Stop reason: HOSPADM

## 2020-10-21 RX ORDER — SODIUM CHLORIDE, SODIUM LACTATE, POTASSIUM CHLORIDE, CALCIUM CHLORIDE 600; 310; 30; 20 MG/100ML; MG/100ML; MG/100ML; MG/100ML
INJECTION, SOLUTION INTRAVENOUS
Status: DISCONTINUED | OUTPATIENT
Start: 2020-10-21 | End: 2020-10-21

## 2020-10-21 RX ORDER — PROPOFOL 10 MG/ML
INJECTION, EMULSION INTRAVENOUS AS NEEDED
Status: DISCONTINUED | OUTPATIENT
Start: 2020-10-21 | End: 2020-10-21 | Stop reason: HOSPADM

## 2020-10-21 RX ORDER — LIDOCAINE HYDROCHLORIDE 20 MG/ML
INJECTION, SOLUTION EPIDURAL; INFILTRATION; INTRACAUDAL; PERINEURAL AS NEEDED
Status: DISCONTINUED | OUTPATIENT
Start: 2020-10-21 | End: 2020-10-21 | Stop reason: HOSPADM

## 2020-10-21 RX ORDER — MIDAZOLAM HYDROCHLORIDE 1 MG/ML
1 INJECTION, SOLUTION INTRAMUSCULAR; INTRAVENOUS AS NEEDED
Status: DISCONTINUED | OUTPATIENT
Start: 2020-10-21 | End: 2020-10-21 | Stop reason: HOSPADM

## 2020-10-21 RX ORDER — LIDOCAINE HYDROCHLORIDE ANHYDROUS AND DEXTROSE MONOHYDRATE .8; 5 G/100ML; G/100ML
1 INJECTION, SOLUTION INTRAVENOUS CONTINUOUS
Status: DISPENSED | OUTPATIENT
Start: 2020-10-21 | End: 2020-10-22

## 2020-10-21 RX ORDER — BUPIVACAINE HYDROCHLORIDE 5 MG/ML
50 INJECTION, SOLUTION EPIDURAL; INTRACAUDAL ONCE
Status: COMPLETED | OUTPATIENT
Start: 2020-10-21 | End: 2020-10-21

## 2020-10-21 RX ORDER — MIDAZOLAM HYDROCHLORIDE 1 MG/ML
INJECTION, SOLUTION INTRAMUSCULAR; INTRAVENOUS AS NEEDED
Status: DISCONTINUED | OUTPATIENT
Start: 2020-10-21 | End: 2020-10-21 | Stop reason: HOSPADM

## 2020-10-21 RX ORDER — KETAMINE HYDROCHLORIDE 10 MG/ML
INJECTION, SOLUTION INTRAMUSCULAR; INTRAVENOUS AS NEEDED
Status: DISCONTINUED | OUTPATIENT
Start: 2020-10-21 | End: 2020-10-21 | Stop reason: HOSPADM

## 2020-10-21 RX ORDER — METRONIDAZOLE 500 MG/100ML
500 INJECTION, SOLUTION INTRAVENOUS EVERY 12 HOURS
Status: COMPLETED | OUTPATIENT
Start: 2020-10-21 | End: 2020-10-22

## 2020-10-21 RX ORDER — FENTANYL CITRATE 50 UG/ML
50 INJECTION, SOLUTION INTRAMUSCULAR; INTRAVENOUS AS NEEDED
Status: DISCONTINUED | OUTPATIENT
Start: 2020-10-21 | End: 2020-10-21 | Stop reason: HOSPADM

## 2020-10-21 RX ORDER — HYDROMORPHONE HYDROCHLORIDE 1 MG/ML
0.2 INJECTION, SOLUTION INTRAMUSCULAR; INTRAVENOUS; SUBCUTANEOUS
Status: DISCONTINUED | OUTPATIENT
Start: 2020-10-21 | End: 2020-10-21 | Stop reason: HOSPADM

## 2020-10-21 RX ORDER — DEXMEDETOMIDINE HYDROCHLORIDE 100 UG/ML
INJECTION, SOLUTION INTRAVENOUS AS NEEDED
Status: DISCONTINUED | OUTPATIENT
Start: 2020-10-21 | End: 2020-10-21 | Stop reason: HOSPADM

## 2020-10-21 RX ORDER — HYDROMORPHONE HYDROCHLORIDE 1 MG/ML
1 INJECTION, SOLUTION INTRAMUSCULAR; INTRAVENOUS; SUBCUTANEOUS
Status: DISCONTINUED | OUTPATIENT
Start: 2020-10-21 | End: 2020-10-23 | Stop reason: HOSPADM

## 2020-10-21 RX ORDER — GABAPENTIN 250 MG/5ML
500 SOLUTION ORAL ONCE
Status: COMPLETED | OUTPATIENT
Start: 2020-10-21 | End: 2020-10-21

## 2020-10-21 RX ORDER — ONDANSETRON 2 MG/ML
4 INJECTION INTRAMUSCULAR; INTRAVENOUS AS NEEDED
Status: DISCONTINUED | OUTPATIENT
Start: 2020-10-21 | End: 2020-10-21 | Stop reason: HOSPADM

## 2020-10-21 RX ORDER — SODIUM CHLORIDE 0.9 % (FLUSH) 0.9 %
5-40 SYRINGE (ML) INJECTION EVERY 8 HOURS
Status: DISCONTINUED | OUTPATIENT
Start: 2020-10-21 | End: 2020-10-23 | Stop reason: HOSPADM

## 2020-10-21 RX ORDER — SODIUM CHLORIDE 9 MG/ML
25 INJECTION, SOLUTION INTRAVENOUS CONTINUOUS
Status: DISCONTINUED | OUTPATIENT
Start: 2020-10-21 | End: 2020-10-21 | Stop reason: HOSPADM

## 2020-10-21 RX ORDER — NALOXONE HYDROCHLORIDE 0.4 MG/ML
0.4 INJECTION, SOLUTION INTRAMUSCULAR; INTRAVENOUS; SUBCUTANEOUS AS NEEDED
Status: DISCONTINUED | OUTPATIENT
Start: 2020-10-21 | End: 2020-10-23 | Stop reason: HOSPADM

## 2020-10-21 RX ORDER — SODIUM CHLORIDE, SODIUM LACTATE, POTASSIUM CHLORIDE, CALCIUM CHLORIDE 600; 310; 30; 20 MG/100ML; MG/100ML; MG/100ML; MG/100ML
125 INJECTION, SOLUTION INTRAVENOUS CONTINUOUS
Status: DISCONTINUED | OUTPATIENT
Start: 2020-10-21 | End: 2020-10-21 | Stop reason: HOSPADM

## 2020-10-21 RX ORDER — FENTANYL CITRATE 50 UG/ML
INJECTION, SOLUTION INTRAMUSCULAR; INTRAVENOUS AS NEEDED
Status: DISCONTINUED | OUTPATIENT
Start: 2020-10-21 | End: 2020-10-21 | Stop reason: HOSPADM

## 2020-10-21 RX ORDER — LORAZEPAM 2 MG/ML
1 INJECTION INTRAMUSCULAR
Status: DISCONTINUED | OUTPATIENT
Start: 2020-10-21 | End: 2020-10-23 | Stop reason: HOSPADM

## 2020-10-21 RX ADMIN — FENTANYL CITRATE 50 MCG: 50 INJECTION, SOLUTION INTRAMUSCULAR; INTRAVENOUS at 09:46

## 2020-10-21 RX ADMIN — DEXMEDETOMIDINE HYDROCHLORIDE 10 MCG: 100 INJECTION, SOLUTION, CONCENTRATE INTRAVENOUS at 09:23

## 2020-10-21 RX ADMIN — MIDAZOLAM HYDROCHLORIDE 2 MG: 1 INJECTION, SOLUTION INTRAMUSCULAR; INTRAVENOUS at 07:36

## 2020-10-21 RX ADMIN — FENTANYL CITRATE 50 MCG: 50 INJECTION, SOLUTION INTRAMUSCULAR; INTRAVENOUS at 07:36

## 2020-10-21 RX ADMIN — FENTANYL CITRATE 25 MCG: 50 INJECTION, SOLUTION INTRAMUSCULAR; INTRAVENOUS at 10:20

## 2020-10-21 RX ADMIN — ROCURONIUM BROMIDE 50 MG: 10 SOLUTION INTRAVENOUS at 07:36

## 2020-10-21 RX ADMIN — SODIUM CHLORIDE, SODIUM LACTATE, POTASSIUM CHLORIDE, AND CALCIUM CHLORIDE 125 ML/HR: 600; 310; 30; 20 INJECTION, SOLUTION INTRAVENOUS at 23:27

## 2020-10-21 RX ADMIN — DEXAMETHASONE SODIUM PHOSPHATE 4 MG: 4 INJECTION, SOLUTION INTRAMUSCULAR; INTRAVENOUS at 08:37

## 2020-10-21 RX ADMIN — Medication 40 MCG: at 08:07

## 2020-10-21 RX ADMIN — Medication 80 MCG: at 09:10

## 2020-10-21 RX ADMIN — HYDROMORPHONE HYDROCHLORIDE 1 MG: 1 INJECTION, SOLUTION INTRAMUSCULAR; INTRAVENOUS; SUBCUTANEOUS at 12:18

## 2020-10-21 RX ADMIN — DEXMEDETOMIDINE HYDROCHLORIDE 10 MCG: 100 INJECTION, SOLUTION, CONCENTRATE INTRAVENOUS at 09:32

## 2020-10-21 RX ADMIN — LIDOCAINE HYDROCHLORIDE 100 MG: 20 INJECTION, SOLUTION EPIDURAL; INFILTRATION; INTRACAUDAL; PERINEURAL at 07:36

## 2020-10-21 RX ADMIN — LIDOCAINE HYDROCHLORIDE 2 MG/KG/HR: 8 INJECTION, SOLUTION INTRAVENOUS at 07:42

## 2020-10-21 RX ADMIN — DEXMEDETOMIDINE HYDROCHLORIDE 10 MCG: 100 INJECTION, SOLUTION, CONCENTRATE INTRAVENOUS at 09:28

## 2020-10-21 RX ADMIN — ONDANSETRON HYDROCHLORIDE 4 MG: 2 INJECTION, SOLUTION INTRAMUSCULAR; INTRAVENOUS at 09:19

## 2020-10-21 RX ADMIN — LORAZEPAM 1 MG: 2 INJECTION INTRAMUSCULAR; INTRAVENOUS at 11:00

## 2020-10-21 RX ADMIN — GABAPENTIN 500 MG: 250 SOLUTION ORAL at 06:52

## 2020-10-21 RX ADMIN — SODIUM CHLORIDE, SODIUM LACTATE, POTASSIUM CHLORIDE, AND CALCIUM CHLORIDE 125 ML/HR: 600; 310; 30; 20 INJECTION, SOLUTION INTRAVENOUS at 09:55

## 2020-10-21 RX ADMIN — Medication 10 ML: at 14:00

## 2020-10-21 RX ADMIN — HYDROMORPHONE HYDROCHLORIDE 1 MG: 1 INJECTION, SOLUTION INTRAMUSCULAR; INTRAVENOUS; SUBCUTANEOUS at 22:19

## 2020-10-21 RX ADMIN — Medication 80 MCG: at 07:52

## 2020-10-21 RX ADMIN — SUCCINYLCHOLINE CHLORIDE 180 MG: 20 INJECTION, SOLUTION INTRAMUSCULAR; INTRAVENOUS at 07:36

## 2020-10-21 RX ADMIN — MIDAZOLAM HYDROCHLORIDE 3 MG: 1 INJECTION, SOLUTION INTRAMUSCULAR; INTRAVENOUS at 07:26

## 2020-10-21 RX ADMIN — PROPOFOL 100 MG: 10 INJECTION, EMULSION INTRAVENOUS at 07:37

## 2020-10-21 RX ADMIN — ACETAMINOPHEN ORAL SOLUTION 1000 MG: 650 SOLUTION ORAL at 06:30

## 2020-10-21 RX ADMIN — SODIUM CHLORIDE, SODIUM LACTATE, POTASSIUM CHLORIDE, AND CALCIUM CHLORIDE 125 ML/HR: 600; 310; 30; 20 INJECTION, SOLUTION INTRAVENOUS at 15:48

## 2020-10-21 RX ADMIN — HYOSCYAMINE SULFATE 0.12 MG: 0.12 TABLET ORAL; SUBLINGUAL at 14:39

## 2020-10-21 RX ADMIN — ROCURONIUM BROMIDE 20 MG: 10 SOLUTION INTRAVENOUS at 08:53

## 2020-10-21 RX ADMIN — HYDROMORPHONE HYDROCHLORIDE 1 MG: 1 INJECTION, SOLUTION INTRAMUSCULAR; INTRAVENOUS; SUBCUTANEOUS at 15:43

## 2020-10-21 RX ADMIN — GENTAMICIN SULFATE 430 MG: 40 INJECTION, SOLUTION INTRAMUSCULAR; INTRAVENOUS at 08:30

## 2020-10-21 RX ADMIN — FENTANYL CITRATE 50 MCG: 50 INJECTION, SOLUTION INTRAMUSCULAR; INTRAVENOUS at 08:53

## 2020-10-21 RX ADMIN — GABAPENTIN 200 MG: 100 CAPSULE ORAL at 20:41

## 2020-10-21 RX ADMIN — Medication 40 MCG: at 09:04

## 2020-10-21 RX ADMIN — KETAMINE HYDROCHLORIDE 50 MG: 10 INJECTION, SOLUTION INTRAMUSCULAR; INTRAVENOUS at 07:36

## 2020-10-21 RX ADMIN — HYDROMORPHONE HYDROCHLORIDE 1 MG: 1 INJECTION, SOLUTION INTRAMUSCULAR; INTRAVENOUS; SUBCUTANEOUS at 19:03

## 2020-10-21 RX ADMIN — PROPOFOL 200 MG: 10 INJECTION, EMULSION INTRAVENOUS at 07:36

## 2020-10-21 RX ADMIN — METRONIDAZOLE 500 MG: 500 INJECTION, SOLUTION INTRAVENOUS at 12:44

## 2020-10-21 RX ADMIN — FENTANYL CITRATE 50 MCG: 50 INJECTION, SOLUTION INTRAMUSCULAR; INTRAVENOUS at 09:32

## 2020-10-21 RX ADMIN — SUGAMMADEX 241 MG: 100 INJECTION, SOLUTION INTRAVENOUS at 09:26

## 2020-10-21 RX ADMIN — SODIUM CHLORIDE, SODIUM LACTATE, POTASSIUM CHLORIDE, AND CALCIUM CHLORIDE 125 ML/HR: 600; 310; 30; 20 INJECTION, SOLUTION INTRAVENOUS at 06:40

## 2020-10-21 NOTE — PERIOP NOTES
Patient: Burnadette Lundborg MRN: [id number]  SSN: [id number]   YOB: 1990  Age: 31 y.o. Sex: female     Patient is status post Procedure(s):  LAPAROSCOPIC REVISION OF SLEEVE GASTRECTOMY TO LAPAROSCOPIC GASTRIC BYPASS WITH EGD (E R A S)  ESOPHAGOGASTRODUODENOSCOPY (EGD). Surgeon(s) and Role:     * Mindy Angulo MD - Primary     * Farnaz Gallegos MD - Assisting    Local/Dose/Irrigation:  0.5% bupivacaine                  Peripheral IV 10/21/20 Left Hand (Active)          Nathan Shin #1 10/21/20 Right Abdomen (Active)                     Dressing/Packing:  Wound Abdomen-Dressing Type: Adhesive wound dressing (Band-Aid); Topical skin adhesive/glue(x4 trocar sites and drain) (10/21/20 0900)    Splint/Cast:  ]    Other:

## 2020-10-21 NOTE — PERIOP NOTES
TRANSFER - OUT REPORT: 
 
Verbal report given to ***(name) on Aaron Fraire  being transferred to room 517(unit) for routine post - op Report consisted of patients Situation, Background, Assessment and  
Recommendations(SBAR). Time Pre op antibiotic given:0830 Anesthesia Stop time: 3854 Grimaldo Present on Transfer to floor:n/a Order for Grimaldo on Chart:n/a 
Discharge Prescriptions with Chart:n/a Information from the following report(s) SBAR, Kardex, OR Summary, Procedure Summary, Intake/Output, MAR, Recent Results and Cardiac Rhythm SR/SB was reviewed with the receiving nurse. Opportunity for questions and clarification was provided. Is the patient on 02? YES 
     L/Min 2 Is the patient on a monitor? NO Is the nurse transporting with the patient? NO Surgical Waiting Area notified of patient's transfer from PACU? YES The following personal items collected during your admission accompanied patient upon transfer:  
Dental Appliance: Dental Appliances: Other (comment)(bag of clothes and partials returned to pt in PACU) Vision: Visual Aid: None Hearing Aid:   
Jewelry: Jewelry: None Clothing: Clothing: (clothes to pacu) Other Valuables: Other Valuables: Cell Phone(pt did not want to lock up cell phone to pacu in bag.) Valuables sent to safe:

## 2020-10-21 NOTE — ANESTHESIA POSTPROCEDURE EVALUATION
Post-Anesthesia Evaluation and Assessment    Patient: Cuco Kelly MRN: [id number]  SSN: [id number]    YOB: 1990  Age: 31 y.o. Sex: female      I have evaluated the patient and they are stable and ready for discharge from the PACU. Cardiovascular Function/Vital Signs  Visit Vitals  /65   Pulse (!) 59   Temp 36.4 °C (97.5 °F)   Resp 18   Ht 5' 8\" (1.727 m)   Wt 120.4 kg (265 lb 6.9 oz)   SpO2 100%   BMI 40.36 kg/m²       Patient is status post General anesthesia for Procedure(s):  LAPAROSCOPIC REVISION OF SLEEVE GASTRECTOMY TO LAPAROSCOPIC GASTRIC BYPASS WITH EGD (E R A S)  ESOPHAGOGASTRODUODENOSCOPY (EGD). Nausea/Vomiting: None    Postoperative hydration reviewed and adequate. Pain:  Pain Scale 1: Adult Nonverbal Pain Scale (10/21/20 1030)  Pain Intensity 1: 0 (10/21/20 1030)   Managed    Neurological Status:   Neuro (WDL): Exceptions to WDL (10/21/20 1020)  Neuro  Neurologic State: Drowsy; Alert;Eyes open to stimulus; Eyes open spontaneously; Eyes open to voice (10/21/20 1020)  Orientation Level: Oriented X4 (10/21/20 1020)  Cognition: Follows commands (10/21/20 1020)  LUE Motor Response: Purposeful (10/21/20 1020)  LLE Motor Response: Purposeful (10/21/20 1020)  RUE Motor Response: Purposeful (10/21/20 1020)  RLE Motor Response: Purposeful (10/21/20 1020)   At baseline    Mental Status, Level of Consciousness: Alert and  oriented to person, place, and time    Pulmonary Status:   O2 Device: Nasal cannula (10/21/20 1020)   Adequate oxygenation and airway patent    Complications related to anesthesia: None    Post-anesthesia assessment completed. No concerns    Signed By: Leonid Perkins MD     October 21, 2020              Procedure(s):  LAPAROSCOPIC REVISION OF SLEEVE GASTRECTOMY TO LAPAROSCOPIC GASTRIC BYPASS WITH EGD (E R A S)  ESOPHAGOGASTRODUODENOSCOPY (EGD).     general    <BSHSIANPOST>    INITIAL Post-op Vital signs:   Vitals Value Taken Time   /65 10/21/2020 10:45 AM Temp 36.4 °C (97.5 °F) 10/21/2020 10:20 AM   Pulse 57 10/21/2020 10:49 AM   Resp 20 10/21/2020 10:49 AM   SpO2 100 % 10/21/2020 10:49 AM   Vitals shown include unvalidated device data.

## 2020-10-21 NOTE — PROGRESS NOTES
12:01 PM: Patient arrived on floor at this time. Patient drowsy and currently sleeping, but easily arousable. Complaining of 8/10 pain but no nausea. PRN pain medication given. Dual skin performed with REJI Aguero, no abnormalities noted. Patient had scheduled tylenol at this time, held d/t being too drowsy from surgery. 3:00 PM: Patient complaining of severe pain, dilaudid not able to be given at this time. Given PRN levsin, as patient stated there was also some left shoulder pain. 3:50 PM: Patient ambulated to bathroom at this time, felt a little dizzy, but tolerated well. 450ml of clear, yellow/straw colored urine emptied from hat. Patient still complaining of severe pain, PRN dilaudid given. Patients  currently at beside    7:15PM: Patient ambulated with PCT from room to first nurses station and back. Tolerated well, only a bit of nausea, returned to room and sat in chair for a little bit. RN assisted patient to bathroom at this time, patient voided 350 ml of same colored urine as previous output. ADEBAYO emptied at this time, 60ml of serosanguineous fluid emptied. Patient still not feeling ready to take oral medication, so RN held tylenol. Patient using incentive and getting up to level 1500. Patient displaying proactive attitude in reaching goals and getting ready for discharge.

## 2020-10-21 NOTE — ANESTHESIA PREPROCEDURE EVALUATION
Relevant Problems   No relevant active problems       Anesthetic History   No history of anesthetic complications            Review of Systems / Medical History  Patient summary reviewed, nursing notes reviewed and pertinent labs reviewed    Pulmonary            Asthma : well controlled       Neuro/Psych         Headaches     Cardiovascular  Within defined limits  Hypertension: well controlled              Exercise tolerance: >4 METS     GI/Hepatic/Renal  Within defined limits   GERD: well controlled           Endo/Other        Obesity     Other Findings            Physical Exam    Airway  Mallampati: II  TM Distance: > 6 cm  Neck ROM: normal range of motion   Mouth opening: Normal     Cardiovascular  Regular rate and rhythm,  S1 and S2 normal,  no murmur, click, rub, or gallop             Dental  No notable dental hx       Pulmonary  Breath sounds clear to auscultation               Abdominal  GI exam deferred       Other Findings            Anesthetic Plan    ASA: 3  Anesthesia type: general          Induction: Intravenous  Anesthetic plan and risks discussed with: Patient

## 2020-10-21 NOTE — H&P
HISTORY& PHYSICAL    Nick Nazario is a 31 White y.o. female with a history of morbid obesity, initially managed through laparoscopic sleeve gastrectomy in  with good weight loss results (115 pound weight loss). She has experienced gradual weight regain (approximately 90 pounds, temporally related to 2 pregnancies); she has also experienced worsening gastroesophageal reflux disease symptoms, poorly controlled with medical management. Patient Active Problem List   Diagnosis Code    Morbid obesity ([facility] E66.01    Hypertension I10    Asthma J45.909    Chronic headaches R51    Snoring R06.83    Heartburn R12    Vitamin D deficiency E55. 9           Past Medical History:   Diagnosis Date    Asthma       AS CHILD    Bilateral knee pain 3/28/2012    Chronic foot pain 3/28/2012    Chronic headaches 3/28/2012    GERD (gastroesophageal reflux disease)      Heartburn 3/28/2012    Hypertension      Low back pain 3/28/2012    Morbid obesity ([facility] 3/28/2012    Snoring              Past Surgical History:   Procedure Laterality Date    ABDOMEN SURGERY PROC UNLISTED        HX  SECTION       HX GASTRECTOMY   13     lap slleve gastrectomy by Dr Jimena Perkins        LAP SLEEVE GASTRECTOMY    HX HEENT   2007     jaws wired shut and bone graft at [facility] by Dr Nellie Goldberg       HX ORTHOPAEDIC   2009     humerous reconstruction after injury by Dr Ramirez American at [address]     at [address]        Socioeconomic History    Marital status:        Spouse name: Not on file    Number of children: Not on file    Years of education: Not on file    Highest education level: Not on file   Occupational History    Occupation: homemaker    Social Needs    Financial resource strain: Not on file    Food insecurity       Worry: Not on file       Inability: Not on file   Ilene Massey Transportation needs       Medical: Not on file       Non-medical: Not on file   Tobacco Use    Smoking status: Former Smoker       Packs/day: 0.50       Years: 7.00       Pack years: 3.50       Last attempt to quit: 2011       Years since quittin.0    Smokeless tobacco: Never Used   Substance and Sexual Activity    Alcohol use: Yes       Frequency: 2-4 times a month       Comment: OCCASIONALLY    Drug use: No    Sexual activity: Yes       Partners: Male       Birth control/protection: I.U.D.    Lifestyle    Physical activity       Days per week: Not on file       Minutes per session: Not on file    Stress: Not on file   Relationships    Social connections       Talks on phone: Not on file       Gets together: Not on file       Attends [employer] service: Not on file       Active member of club or organization: Not on file       Attends meetings of clubs or organizations: Not on file       Relationship status: Not on file    Intimate partner violence       Fear of current or ex partner: Not on file       Emotionally abused: Not on file       Physically abused: Not on file       Forced sexual activity: Not on file   Other Topics Concern    Not on file   Social History Narrative     In the home with spouse and 3 children ages 8, 1 and              Family History   Problem Relation Age of Onset    Obesity Mother      Migraines Mother      Hypertension Mother      No Known Problems Brother      Cancer Maternal Grandmother           thyroid cancer    Heart Disease Maternal Grandfather      Hypertension Maternal Grandfather      No Known Problems Father      Anesth Problems Neg Hx             Allergies   Allergen Reactions    Latex Rash    Bactrim [Sulfamethoxazole-Trimethoprim] Rash    Ceclor [Cefaclor] Rash    Erythromycin Rash    Keflex [Cephalexin] Rash    Morphine Nausea Only    Sulfa (Sulfonamide Antibiotics) Rash         Review of Systems   HENT: Negative for congestion, hearing loss, sore throat and tinnitus. Eyes: Positive for blurred vision (supposed to wear glasses). Respiratory: Positive for shortness of breath (if walks fast ). Negative for cough, sputum production and wheezing. Cardiovascular: Negative for chest pain and leg swelling. No hx DVT and no FH of clotting disorder    Gastrointestinal: Positive for diarrhea (since gallbladder ) and heartburn (better with PPI ). Negative for abdominal pain, blood in stool, constipation, nausea and vomiting. Genitourinary: Positive for urgency (stress incontinence ). Negative for dysuria, flank pain, frequency and hematuria. Musculoskeletal: Negative for back pain, falls and myalgias. Skin: Negative. Neurological: Positive for headaches (all the time, better with BP meds ). Negative for tingling and seizures. Endo/Heme/Allergies: Does not bruise/bleed easily. Psychiatric/Behavioral: Sleep poor with kids          Physical Exam  Visit Vitals  /65   Pulse 71   Temp 98.1 °F (36.7 °C)   Resp 16   Ht 5' 8\" (1.727 m)   Wt 265 lb 6.9 oz (120.4 kg)   SpO2 100%   BMI 40.36 kg/m²       Constitutional:       Appearance: She is obese. White female with obesity      HENT:      Head: Normocephalic and atraumatic. Mouth/Throat:      Mouth: Mucous membranes are moist.      Pharynx: Oropharynx is clear. Eyes:      Extraocular Movements: Extraocular movements intact. Conjunctiva/sclera: Conjunctivae normal.      Pupils: Pupils are equal, round, and reactive to light. Cardiovascular:      Rate and Rhythm: Normal rate and regular rhythm. Pulmonary:      Effort: Pulmonary effort is normal.      Breath sounds: Normal breath sounds. Abdominal:      Palpations: Abdomen is soft. Comments: Obese     Musculoskeletal: Normal range of motion. Comments: Ambulating independently    Skin:     General: Skin is warm and dry. Neurological:      General: No focal deficit present.       Mental Status: She is alert and oriented to person, place, and time. Psychiatric:         Mood and Affect: Mood normal.         Behavior: Behavior normal.         Thought Content: Thought content normal.     Upper gastrointestinal series: Expected sleeve gastrectomy anatomy; spontaneous reflux is present; no hiatal hernia.      ASSESSMENT and PLAN      ICD-10-CM ICD-9-CM     1. Morbid obesity ([facility] [address]  E66.01 278.01     2. Essential hypertension  I10 401. 9     3. Gastroesophageal reflux disease, esophagitis presence not specified  K21.9 530.81 omeprazole (PRILOSEC) 20 mg capsule   4. Mild intermittent asthma without complication  C66.30 022.11     5. Fatty liver  K76.0 571. 8     6. BMI 40.0-44.9, adult ([facility])  Z68.41 V85.41     7. Post-op pain  G89.18 338.18 gabapentin (NEURONTIN) 100 mg capsule      Plan laparoscopic revision of sleeve gastrectomy to gastric bypass with upper endoscopy. Technical aspects of procedure reviewed along with risks (to include but not limited to bleeding, wound infection, VTE, leak, open procedure, ongoing reflux symptoms, dysphagia, ulcer, stricture, poor weight loss/weight regain). Also reviewed anticipated hospital course, postoperative diet, activity restrictions, and expected results. She understands and desires to proceed. All questions answered.

## 2020-10-21 NOTE — CONSULTS
NUTRITION Chart reviewed. Post-op bariatric diet instruction completed. Will gladly follow up for additional questions as needed. Thank you.   
 
Spike Kong RD

## 2020-10-21 NOTE — BRIEF OP NOTE
Brief Postoperative Note    Patient: Scarlet Hodgkins  YOB: 1990  MRN: [id number]    Date of Procedure: 10/21/2020     Pre-Op Diagnosis: MORBID OBESITY, GERD    Post-Op Diagnosis: Same as preoperative diagnosis. Procedure(s):  LAPAROSCOPIC REVISION OF SLEEVE GASTRECTOMY TO LAPAROSCOPIC GASTRIC BYPASS WITH EGD (E R A S)    Surgeon(s):  MD Nehal Mccloud MD    Surgical Assistant: None    Anesthesia: General     Estimated Blood Loss (mL): Minimal    Complications: None    Specimens:   ID Type Source Tests Collected by Time Destination   1 : BILATERAL ANASTOMOTIC RINGS, PORTION OF SMALL BOWEL Fresh Tissue  Syed Villagran MD 10/21/2020 5866 Pathology        Implants:   Implant Name Type Inv. Item Serial No.  Lot No. LRB No. Used Action   GORE SEAMGUARD BIOABSORBABLE STAPLE LINE REINFORCEMENT   NA  [id number]/A 3 Implanted   GORE SEAMGUARD BIOABSORBABLE STAPLE LINE REINFORCEMENT   NA  [id number]/A 1 Implanted       Drains:   Jennie Bread #1 10/21/20 Right Abdomen (Active)       Findings: Creation of a 30 cc gastric pouch from prior sleeve gastrectomy, with 470 cm antecolic, antegastric Christiano limb. No insufflation air leak or intraluminal hemorrhage on upper endoscopy.     Electronically Signed by Zakia Pringle MD on 10/21/2020 at 9:59 AM

## 2020-10-21 NOTE — PROGRESS NOTES
TRANSFER - IN REPORT:    Verbal report received from Vijaya(name) on Rojelio Williamson  being received from PACU(unit) for routine post - op      Report consisted of patients Situation, Background, Assessment and   Recommendations(SBAR). Information from the following report(s) SBAR, Kardex, OR Summary, Procedure Summary, Intake/Output, MAR and Recent Results was reviewed with the receiving nurse. Opportunity for questions and clarification was provided. Assessment completed upon patients arrival to unit and care assumed.

## 2020-10-22 ENCOUNTER — APPOINTMENT (OUTPATIENT)
Dept: GENERAL RADIOLOGY | Age: 30
DRG: 403 | End: 2020-10-22
Attending: SURGERY
Payer: MEDICAID

## 2020-10-22 LAB
ANION GAP SERPL CALC-SCNC: 11 MMOL/L (ref 5–15)
BASOPHILS # BLD: 0 K/UL (ref 0–0.1)
BASOPHILS NFR BLD: 0 % (ref 0–1)
BUN SERPL-MCNC: 12 MG/DL (ref 6–20)
BUN/CREAT SERPL: 11 (ref 12–20)
CALCIUM SERPL-MCNC: 9.2 MG/DL (ref 8.5–10.1)
CHLORIDE SERPL-SCNC: 100 MMOL/L (ref 97–108)
CO2 SERPL-SCNC: 25 MMOL/L (ref 21–32)
CREAT SERPL-MCNC: 1.05 MG/DL (ref 0.55–1.02)
DIFFERENTIAL METHOD BLD: ABNORMAL
EOSINOPHIL # BLD: 0 K/UL (ref 0–0.4)
EOSINOPHIL NFR BLD: 0 % (ref 0–7)
ERYTHROCYTE [DISTWIDTH] IN BLOOD BY AUTOMATED COUNT: 15.9 % (ref 11.5–14.5)
GLUCOSE SERPL-MCNC: 96 MG/DL (ref 65–100)
HCT VFR BLD AUTO: 40.2 % (ref 35–47)
HGB BLD-MCNC: 12.7 G/DL (ref 11.5–16)
IMM GRANULOCYTES # BLD AUTO: 0 K/UL (ref 0–0.04)
IMM GRANULOCYTES NFR BLD AUTO: 0 % (ref 0–0.5)
LYMPHOCYTES # BLD: 1.2 K/UL (ref 0.8–3.5)
LYMPHOCYTES NFR BLD: 11 % (ref 12–49)
MCH RBC QN AUTO: 29.2 PG (ref 26–34)
MCHC RBC AUTO-ENTMCNC: 31.6 G/DL (ref 30–36.5)
MCV RBC AUTO: 92.4 FL (ref 80–99)
MONOCYTES # BLD: 0.7 K/UL (ref 0–1)
MONOCYTES NFR BLD: 6 % (ref 5–13)
NEUTS SEG # BLD: 9 K/UL (ref 1.8–8)
NEUTS SEG NFR BLD: 83 % (ref 32–75)
NRBC # BLD: 0 K/UL (ref 0–0.01)
NRBC BLD-RTO: 0 PER 100 WBC
PLATELET # BLD AUTO: 381 K/UL (ref 150–400)
PMV BLD AUTO: 10.7 FL (ref 8.9–12.9)
POTASSIUM SERPL-SCNC: 3.6 MMOL/L (ref 3.5–5.1)
RBC # BLD AUTO: 4.35 M/UL (ref 3.8–5.2)
SODIUM SERPL-SCNC: 136 MMOL/L (ref 136–145)
WBC # BLD AUTO: 11 K/UL (ref 3.6–11)

## 2020-10-22 PROCEDURE — 36415 COLL VENOUS BLD VENIPUNCTURE: CPT

## 2020-10-22 PROCEDURE — 85025 COMPLETE CBC W/AUTO DIFF WBC: CPT

## 2020-10-22 PROCEDURE — 74011250637 HC RX REV CODE- 250/637: Performed by: SURGERY

## 2020-10-22 PROCEDURE — 65660000000 HC RM CCU STEPDOWN

## 2020-10-22 PROCEDURE — 74011000636 HC RX REV CODE- 636: Performed by: SURGERY

## 2020-10-22 PROCEDURE — 74240 X-RAY XM UPR GI TRC 1CNTRST: CPT

## 2020-10-22 PROCEDURE — 74011250636 HC RX REV CODE- 250/636: Performed by: SURGERY

## 2020-10-22 PROCEDURE — 80048 BASIC METABOLIC PNL TOTAL CA: CPT

## 2020-10-22 RX ORDER — LISINOPRIL 20 MG/1
20 TABLET ORAL DAILY
Status: DISCONTINUED | OUTPATIENT
Start: 2020-10-22 | End: 2020-10-23 | Stop reason: HOSPADM

## 2020-10-22 RX ORDER — HYDROMORPHONE HYDROCHLORIDE 2 MG/1
2-4 TABLET ORAL
Status: DISCONTINUED | OUTPATIENT
Start: 2020-10-22 | End: 2020-10-23 | Stop reason: HOSPADM

## 2020-10-22 RX ADMIN — HYDROMORPHONE HYDROCHLORIDE 1 MG: 1 INJECTION, SOLUTION INTRAMUSCULAR; INTRAVENOUS; SUBCUTANEOUS at 09:12

## 2020-10-22 RX ADMIN — HYDROMORPHONE HYDROCHLORIDE 1 MG: 1 INJECTION, SOLUTION INTRAMUSCULAR; INTRAVENOUS; SUBCUTANEOUS at 01:21

## 2020-10-22 RX ADMIN — GABAPENTIN 200 MG: 100 CAPSULE ORAL at 20:49

## 2020-10-22 RX ADMIN — SODIUM CHLORIDE, SODIUM LACTATE, POTASSIUM CHLORIDE, AND CALCIUM CHLORIDE 125 ML/HR: 600; 310; 30; 20 INJECTION, SOLUTION INTRAVENOUS at 16:27

## 2020-10-22 RX ADMIN — METRONIDAZOLE 500 MG: 500 INJECTION, SOLUTION INTRAVENOUS at 01:21

## 2020-10-22 RX ADMIN — GABAPENTIN 200 MG: 100 CAPSULE ORAL at 09:12

## 2020-10-22 RX ADMIN — HYOSCYAMINE SULFATE 0.12 MG: 0.12 TABLET ORAL; SUBLINGUAL at 14:03

## 2020-10-22 RX ADMIN — ACETAMINOPHEN 1000 MG: 500 TABLET ORAL at 12:25

## 2020-10-22 RX ADMIN — HYOSCYAMINE SULFATE 0.12 MG: 0.12 TABLET ORAL; SUBLINGUAL at 19:23

## 2020-10-22 RX ADMIN — ACETAMINOPHEN 1000 MG: 500 TABLET ORAL at 01:21

## 2020-10-22 RX ADMIN — IOHEXOL 50 ML: 350 INJECTION, SOLUTION INTRAVENOUS at 08:00

## 2020-10-22 RX ADMIN — HYDROMORPHONE HYDROCHLORIDE 4 MG: 2 TABLET ORAL at 12:24

## 2020-10-22 RX ADMIN — ACETAMINOPHEN 1000 MG: 500 TABLET ORAL at 18:03

## 2020-10-22 RX ADMIN — HYDROMORPHONE HYDROCHLORIDE 4 MG: 2 TABLET ORAL at 20:49

## 2020-10-22 RX ADMIN — LISINOPRIL 20 MG: 20 TABLET ORAL at 12:25

## 2020-10-22 RX ADMIN — HYDROMORPHONE HYDROCHLORIDE 4 MG: 2 TABLET ORAL at 16:27

## 2020-10-22 RX ADMIN — ONDANSETRON 4 MG: 2 INJECTION INTRAMUSCULAR; INTRAVENOUS at 07:58

## 2020-10-22 RX ADMIN — HYDROMORPHONE HYDROCHLORIDE 1 MG: 1 INJECTION, SOLUTION INTRAMUSCULAR; INTRAVENOUS; SUBCUTANEOUS at 05:25

## 2020-10-22 NOTE — PROGRESS NOTES
2030: Notified by [address] that pt's telemetry was showing prominent ST elevations. Pt asleep, in no distress. Pt woken and stated no chest pain at this time. Paged MD Oneill. STAT EKG ordered. 2130: EKG completed by respiratory. MD Oneill to look at EKG results. Will continue to monitor. 2200: No new orders placed. 0350: Pt up to bathroom at this time. Pt walked 1 full lap with this nurse on [address]. Pt tolerated walk well. Pt returned to bed, resting quietly with  at bedside. 0515: Lidocaine drip stopped at this time. 0530: Pt has voided 800 ml of clear yellow urine since beginning of shift. 30 cc of serosanguinous drainage emptied from leona. Pt has tolerated some ice chips throughout shift and was able to swallow pills. Pt receiving 1mg of dilaudid Q3 PRN throughout shift. Ice pack applied to left abd. Pain 7-10/10 while patient is awake. No complaints of nausea throughout shift.      0625: Pt up to walk with  at this time. 8183: Pt walked 2 full laps on [address]. Voided another 400 ml of clear, yellow urine. Pt sitting up in chair at this time. Bedside shift change report given to Zaira Paz RN (oncoming nurse) by Meli Royal RN (offgoing nurse). Report included the following information SBAR, Kardex, Intake/Output, MAR and Recent Results.

## 2020-10-22 NOTE — PROGRESS NOTES
Progress Note    Patient: Cuco Kelly MRN: [id number]  SSN: [id number]    YOB: 1990  Age: 31 y.o. Sex: female      Admit Date: 10/21/2020    1 Day Post-Op    Procedure:  Procedure(s):  LAPAROSCOPIC REVISION OF SLEEVE GASTRECTOMY TO LAPAROSCOPIC GASTRIC BYPASS WITH EGD (E R A S)    Subjective:     Patient has good incisional pain control. She denies nausea. She is ambulating and performing spirometry. No chest pain. Objective:     Visit Vitals  /73   Pulse 78   Temp 98.1 °F (36.7 °C)   Resp 18   Ht 5' 8\" (1.727 m)   Wt 265 lb 6.9 oz (120.4 kg)   SpO2 95%   BMI 40.36 kg/m²       Temp (24hrs), Av °F (36.7 °C), Min:97.5 °F (36.4 °C), Max:98.4 °F (36.9 °C)    Date 10/21/20 0700 - 10/22/20 0659 10/22/20 07 - 10/23/20 0659   Shift 9266-6267 9986-8214 24 Hour Total 4419-2042 0984-1123 24 Hour Total   INTAKE   I.V.(mL/kg/hr) 1200(0.8)  1200(0.4)        I.V. 1200  1200      Shift Total(mL/kg) 1200(10)  1200(10)      OUTPUT   Urine(mL/kg/hr) 450(0.3) 1550(1.1) 2000(0.7)        Urine Voided 450 1550 2000      Drains 45 90 135        Output (ml) (Josiah Drain #1 10/21/20 Right Abdomen) 45 90 135      Blood 25  25        Estimated Blood Loss 25  25      Shift Total(mL/kg) 520(4.3) 1640(13.6) 2160(17.9)       -8982 -320      Weight (kg) 120.4 120.4 120.4 120.4 120.4 120.4       Physical Exam:    LUNG: clear to auscultation bilaterally, HEART: regular rate and rhythm, S1, S2 normal, no murmur, click, rub or gallop, ABDOMEN: Obese, nondistended, soft. Wounds dry and intact. Serosanguineous drain fluid. Appropriate incisional pain with palpation.     Data Review: Vital signs, ins/outs, labs    Lab Review:   Recent Results (from the past 12 hour(s))   METABOLIC PANEL, BASIC    Collection Time: 10/22/20  4:00 AM   Result Value Ref Range    Sodium 136 136 - 145 mmol/L    Potassium 3.6 3.5 - 5.1 mmol/L    Chloride 100 97 - 108 mmol/L    CO2 25 21 - 32 mmol/L    Anion gap 11 5 - 15 mmol/L Glucose 96 65 - 100 mg/dL    BUN 12 6 - 20 MG/DL    Creatinine 1.05 (H) 0.55 - 1.02 MG/DL    BUN/Creatinine ratio 11 (L) 12 - 20      GFR est AA >60 >60 ml/min/1.73m2    GFR est non-AA >60 >60 ml/min/1.73m2    Calcium 9.2 8.5 - 10.1 MG/DL   CBC WITH AUTOMATED DIFF    Collection Time: 10/22/20  4:00 AM   Result Value Ref Range    WBC 11.0 3.6 - 11.0 K/uL    RBC 4.35 3.80 - 5.20 M/uL    HGB 12.7 11.5 - 16.0 g/dL    HCT 40.2 35.0 - 47.0 %    MCV 92.4 80.0 - 99.0 FL    MCH 29.2 26.0 - 34.0 PG    MCHC 31.6 30.0 - 36.5 g/dL    RDW 15.9 (H) 11.5 - 14.5 %    PLATELET 299 437 - 876 K/uL    MPV 10.7 8.9 - 12.9 FL    NRBC 0.0 0  WBC    ABSOLUTE NRBC 0.00 0.00 - 0.01 K/uL    NEUTROPHILS 83 (H) 32 - 75 %    LYMPHOCYTES 11 (L) 12 - 49 %    MONOCYTES 6 5 - 13 %    EOSINOPHILS 0 0 - 7 %    BASOPHILS 0 0 - 1 %    IMMATURE GRANULOCYTES 0 0.0 - 0.5 %    ABS. NEUTROPHILS 9.0 (H) 1.8 - 8.0 K/UL    ABS. LYMPHOCYTES 1.2 0.8 - 3.5 K/UL    ABS. MONOCYTES 0.7 0.0 - 1.0 K/UL    ABS. EOSINOPHILS 0.0 0.0 - 0.4 K/UL    ABS. BASOPHILS 0.0 0.0 - 0.1 K/UL    ABS. IMM. GRANS. 0.0 0.00 - 0.04 K/UL    DF AUTOMATED           Assessment:     Hospital Problems  Date Reviewed: 10/21/2020          Codes Class Noted POA    [facility] ([facility] ICD-10-CM: E66.01  ICD-9-CM: 278.01  3/28/2012 Unknown    Overview Addendum 3/28/2012  2:28 PM by Kalia Clayton NP     Overweight since childhood. Highest adult weight is 316.5 pounds. Lowest adult weight is 220 pounds at age 24. Plan/Recommendations/Medical Decision Makin. Upper gastrointestinal series this morning-if negative begin bariatric liquids, oral analgesics. 2.  Spirometry, mechanical DVT prophylaxis. 3.  Ambulation in yao every 2 hours.

## 2020-10-22 NOTE — PROGRESS NOTES
NUTRITION     Chart reviewed. Post-op bariatric diet instruction completed. Has the Unjury vitamins at home. Still need to get calcium citrate, reviewed recommended options and list also included in the bariatric discharge booklet. Will gladly follow up for additional questions as needed. Thank you.      Morenita Zuluaga RD

## 2020-10-22 NOTE — OP NOTES
[address] [facility]  OPERATIVE REPORT    Name:  Ayaka Evans  MR#:  [id number]  :  1990  ACCOUNT #:  [de-identified]  DATE OF SERVICE:  10/21/2020      PRIMARY PREOPERATIVE DIAGNOSIS:  Recurrent morbid obesity. SECONDARY PREOPERATIVE DIAGNOSES:  1. Hypertension. 2.  Gastroesophageal reflux disease. 3.  Snoring. 4.  Asthma. POSTOPERATIVE DIAGNOSES:  1. Recurrent morbid obesity. 2.  Hypertension. 3.  Gastroesophageal reflux disease. 4.  Snoring. 5.  Asthma. PROCEDURES PERFORMED:  1. Laparoscopic revision of sleeve gastrectomy to gastric bypass (CPT 81513). 2.  Intraoperative upper endoscopy. SURGEON:  Jerri Baker MD    ASSISTANT:  Manuel Gibbons MD    ANESTHESIA:  General endotracheal.    COMPLICATIONS:  None. SPECIMENS REMOVED:  Segment of small intestine with anastomotic rings. IMPLANTS:  None. ESTIMATED BLOOD LOSS:  30 mL. DRAIN:  A 19-mm Josiah drain. COUNTS:  Sponge count correct. Needle count correct. INDICATIONS:  The patient is a 39-year-old white female with a history of morbid obesity, initially managed through laparoscopic sleeve gastrectomy in  with good weight loss results (115-pound weight loss). She has experienced gradual weight regain following 2 pregnancies, now 90 pounds up from her weight allyson. She has also developed severe, poorly-controlled gastroesophageal reflux disease. All medical efforts at weight loss and reflux control have been unsuccessful. After extensive preoperative counseling, patient education, and medical screening, it was felt she would be a good candidate for weight reduction surgery. She is taken to the operating room today for revision of sleeve gastrectomy to gastric bypass. I have asked Manuel Gibbons MD, to assist with the procedure given the technical complexity of revisional bariatric surgery.   He will run the laparoscope, assist in transection of the sleeve into the gastric pouch, assist in creation of the jejunojejunostomy, and assist in creation of the gastrojejunostomy. FINDINGS:  1. Normal-appearing proximal sleeve anatomy without hiatal hernia. 2.  Creation of a 30 mL gastric pouch with 922 cm antecolic, antegastric Christiano limb. 3.  No intraluminal hemorrhage or insufflation air leak on upper endoscopy. PROCEDURE:  The patient was identified as the correct patient in the preoperative holding area and informed consent was confirmed. After answering the patient's remaining questions, she was taken to the operating room and placed on the operating room table in the supine position. Sequential compression devices were placed on both lower extremities. Following the uneventful initiation of general anesthesia, she was carefully secured to the operating room table with footboard and safety strap in place. All potential pressure points were padded with eggcrate. Her abdomen was prepped and draped in the usual sterile fashion. Final time-out was performed, and it was confirmed she had received intravenous antibiotics. A 5-mm trocar was inserted through a small right upper quadrant skin incision using an Optiview technique. After confirming intraperitoneal location of the trocar tip, insufflation with carbon dioxide gas was initiated. Once adequate working sites had been developed, a 5-mm, 30-degree laparoscope was inserted. No signs of trocar injury were present. The liver was noted to be mildly enlarged. No significant adhesions from prior sleeve gastrectomy were evident. A 5-mm trocar was placed through a small epigastric incision using visual guidance with the laparoscope. A left subcostal GelPort was inserted using identical technique. The right upper quadrant 12-mm trocar was inserted using visual guidance with the laparoscope. The patient was placed in a steep reverse Trendelenburg position. The [facility] liver retractor was inserted through a small subxiphoid incision.   With the left lobe of liver retracted anteriorly and cephalad, the esophageal hiatus and proximal sleeve were visualized. No signs of hiatal hernia were present. The sleeve appeared normal in appearance, without signs of proximal dilatation or angulation/tortuosity. The pars flaccida portion of the gastrohepatic ligament was incised, allowing atraumatic entry into the lesser sac and retrogastric area. A site 4 cm distal to the gastroesophageal junction was chosen. The pouch was freed from retroperitoneal structures along its staple line using the Harmonic scalpel and blunt dissection. Once the proximal sleeve had been freed, the gastrohepatic ligament was serially ligated and divided at this level using the Harmonic scalpel. A purple load linear stapler firing was used to transect the pouch, creating a 30 mL gastric pouch. The patient was returned to the supine position. The omentum was retracted into the upper abdomen. The omentum was serially ligated and divided from its free edge to the antimesenteric border of the transverse colon. With the transverse colon elevated anteriorly and cephalad, the ligament of Treitz was identified. 50 cm of bowel measured distal to this landmark. The jejunum was divided at this point using a tan load linear stapler firing. The distal edge of the transected bowel was marked with 0 Surgidac suture. Small-bowel mesentery was mobilized using the Harmonic scalpel. Once adequate mobility of the alimentary limb had been achieved, an additional 160 cm of bowel was measured distal to this transection site. This segment of bowel was brought alongside the biliopancreatic limb in side-to-side fashion. This orientation was maintained with 0 Surgidac stay sutures. The Harmonic scalpel was used to make an opening in each segment of bowel, through which a 60-mm tan load linear stapler was carefully inserted into each segment of bowel.   After confirming correct insertion of the stapler, it was closed, fired and removed. The staple line was noted to be hemostatic. The remaining opening in the bowel was closed with a running 2-0 Polysorb suture. A tension-relieving, anti-obstruction suture was placed at the distal edge of the staple line. The mesenteric defect was closed with a running 0 Surgidac suture. The Christiano limb was delivered into the upper abdomen with care to avoid twisting of the bowel or its blood supply. A 25 EEA stapler anvil attached to a nasogastric tube was passed transorally, such that the tip of the tube was positioned just anterior to the transverse pouch staple line. Electrocautery was used to make an opening at this site, and the tube was delivered through this opening into the abdominal space. The tube was used to position the anvil within the gastric pouch with Anesthesia staff providing jaw thrust.  The tube was then  from the anvil and removed from the patient's body. The Christiano limb was delivered into the upper abdomen with care to avoid twisting of bowel or its blood supply. The Harmonic scalpel was used to make an opening in the Christiano limb adjacent to the staple line. A segment of circular Seamguard was applied to a 25 EEA stapler, and a protective drape was placed over the end of the stapler. The stapler was then passed through the GelPort, and into the opening made in the Christiano limb to a healthy site of bowel intraluminally. The stapler spike was deployed to the antimesenteric border of the Christiano limb and mated with the anvil within the gastric pouch. Stapler was slowly and carefully closed with care to avoid twisting of the pouch or incorporation of extraneous tissue. After confirming correct stapler closure, it was fired and removed. Both proximal and distal donuts were noted to be intact. The open, redundant segment of the Christiano limb was amputated with a tan load linear stapler firing once the blood supply had been controlled using the Harmonic scalpel. This segment of bowel was placed within a retrieval bag and removed from the patient's body. Once pneumoperitoneum had been reestablished, tension-relieving sutures were placed between the antimesenteric border of the Christiano limb, the gastric pouch, and the antrum using 2-0 Polysorb sutures. An intestinal clamp was placed on the Christiano limb distal to the anastomosis. The patient was placed in the supine position. Sterile saline was instilled into the upper abdomen. Intraoperative upper endoscopy was performed. The gastroscope was passed transorally, through the gastroesophageal junction, and into the gastric pouch. With insufflation using the gastroscope, adequate pouch and Christiano limb distentions were noted. No intraluminal hemorrhage was identified. No insufflation air leak occurred. The anastomosis was hemostatic and patent. The bowel was decompressed and the gastroscope was removed. Sterile saline was evacuated from the upper abdomen. The intestinal clamp was removed. Juarez's space was closed with a running 0 Surgidac suture. The patient was returned to the reverse Trendelenburg position. A 19-mm Josiah drain was inserted into the abdominal space. This was allowed to lie adjacent to the gastrojejunal anastomosis and brought out through the right subcostal 5-mm trocar site. It was secured to the skin with a 2-0 nylon suture. After confirming adequate hemostasis, the Ana liver retractor was removed, followed by removal of the GelPort. My assistant then changed gloves. The GelPort fascial defect was closed with a 0 Vicryl figure-of-eight suture using a laparoscopic suture passer. Pneumoperitoneum was released into a closed system, and all trocars were removed. The GelPort wound was irrigated with gentamicin solution. All wound edges were closed with a combination of subcuticular 4-0 Monocryl suture and Dermabond. The patient tolerated the procedure well.   She was extubated in the operating room and transported to the recovery area in stable condition. The attending surgeon, Dr. Lucina Nuñez, was scrubbed and present for the entire procedure.         Arlin Duque MD      [id number]  D:  10/21/2020 10:15  T:  10/21/2020 20:16  JOB #:  [id number]

## 2020-10-22 NOTE — PROGRESS NOTES
Bedside and Verbal shift change report given to Maeola Homans, RN (oncoming nurse) by Raoul Chou RN (offgoing nurse). Report included the following information SBAR, Kardex, OR Summary, Procedure Summary, Intake/Output, MAR, Recent Results and Cardiac Rhythm NSR.

## 2020-10-22 NOTE — PROGRESS NOTES
8:00 AM: Patient off floor for Upper GI series, gave prn zofran as patient was having some nausea. 11:13 AM: Patient up ambulating around unit. Discussed sitting up in chair for the day and starting bariatric liquids when back in room. Patient verbalized understanding. 1:00 PM: Patient still working on first round of liquids. Stated she has been feeling a little nauseous. Patient told to take liquids slow if feeling discomfort or increased nausea. Patient stated she felt tired, RN encourage patient to sit up in chair instead of getting back in bed. Also encourage patient to ambulate more. 4:45 PM: Patient Working on second round of liquids. Tolerating okay, still a bit of nausea. Patient has ambulated 2 laps around unit. 6:50 PM: Patient has ambulated two more times in hallway. Working on 4th round of liquids, tolerating a lot better since starting this morning. Pain is better controlled on PO pain medicine, now around a 7/10 at the worst. Using incentive and getting up to 1750, voiding appropriately, and has been sitting up in chair all of shift. Emptied a total of 30ml, serosanguinous fluid, from [facility] for whole shift.

## 2020-10-22 NOTE — PROGRESS NOTES
Transition of Care  RUR: 8%    Patient presents from home to the hospital on 10/21/20 for a revision of sleeve gastrectomy; patient is POD#1    Discharge Plan: TBD; likely home with family/followup with specialist  Transport Plan: in car with  Nury Peck [phone]    Discharge pending:  -pending medical progress    [phone]: CM met with patient at bedside; patient is alert and oriented x4; patient states she lives in a home with her  and children; she is normally independent in her ADLs and drives herself; patients PCP is Brandon Thacker NP at [address] and preferred pharmacy is the [facility] [address] In [place]    Reason for Admission:   Morbid obesity                   RUR Score:     8%                Plan for utilizing home health:  TBD; likely home with family/followup with specialist        PCP: First and Last name: Brandon Thacker NP   Name of Practice: [facility] [address]   Are you a current patient: Yes/No: yes   Approximate date of last visit: September 2020   Can you participate in a virtual visit with your PCP:  yes                    Current Advanced Directive/Advance Care Plan: full code; no acp docs                         Transition of Care Plan:  TBD; likely home with family/followup with specialist; transport by  in car    Care Management Interventions  PCP Verified by CM: Yes(Kimberley Thompson MD)  Last Visit to PCP: 09/30/20  Mode of Transport at Discharge: Other (see comment)( by car)  Transition of Care Consult (CM Consult):  Other(TBD; likely home with family; followup with specialist)  Physical Therapy Consult: No  Occupational Therapy Consult: No  Current Support Network: Lives with Spouse  Confirm Follow Up Transport: Family( by car)  Discharge Location  Discharge Placement: Other:(TBD; likely home with family/followup with specialist)     CM following  Sabi Chung RN, CRM

## 2020-10-22 NOTE — PROGRESS NOTES
Patient ambulated and sat in chair for a little bit towards the end of shift. Patient has voided twice since surgery, about 800ml of clear, yellow/straw colored urine. Patient not feeling ready to take oral medication, so RN held tylenol. Tolerating ice chips well. Patient using incentive and getting up to level 1500. Patient displaying proactive attitude in reaching goals and getting ready for discharge.

## 2020-10-23 VITALS
TEMPERATURE: 98.1 F | HEART RATE: 67 BPM | SYSTOLIC BLOOD PRESSURE: 121 MMHG | DIASTOLIC BLOOD PRESSURE: 75 MMHG | OXYGEN SATURATION: 96 % | RESPIRATION RATE: 16 BRPM | WEIGHT: 265.43 LBS | BODY MASS INDEX: 40.23 KG/M2 | HEIGHT: 68 IN

## 2020-10-23 LAB
ANION GAP SERPL CALC-SCNC: 7 MMOL/L (ref 5–15)
ATRIAL RATE: 71 BPM
BASOPHILS # BLD: 0.1 K/UL (ref 0–0.1)
BASOPHILS NFR BLD: 1 % (ref 0–1)
BUN SERPL-MCNC: 6 MG/DL (ref 6–20)
BUN/CREAT SERPL: 8 (ref 12–20)
CALCIUM SERPL-MCNC: 9.1 MG/DL (ref 8.5–10.1)
CALCULATED P AXIS, ECG09: 21 DEGREES
CALCULATED R AXIS, ECG10: 9 DEGREES
CALCULATED T AXIS, ECG11: 5 DEGREES
CHLORIDE SERPL-SCNC: 104 MMOL/L (ref 97–108)
CO2 SERPL-SCNC: 26 MMOL/L (ref 21–32)
CREAT SERPL-MCNC: 0.75 MG/DL (ref 0.55–1.02)
DIAGNOSIS, 93000: NORMAL
DIFFERENTIAL METHOD BLD: ABNORMAL
EOSINOPHIL # BLD: 0.2 K/UL (ref 0–0.4)
EOSINOPHIL NFR BLD: 3 % (ref 0–7)
ERYTHROCYTE [DISTWIDTH] IN BLOOD BY AUTOMATED COUNT: 16 % (ref 11.5–14.5)
GLUCOSE SERPL-MCNC: 83 MG/DL (ref 65–100)
HCT VFR BLD AUTO: 34.5 % (ref 35–47)
HGB BLD-MCNC: 11.4 G/DL (ref 11.5–16)
IMM GRANULOCYTES # BLD AUTO: 0 K/UL (ref 0–0.04)
IMM GRANULOCYTES NFR BLD AUTO: 0 % (ref 0–0.5)
LYMPHOCYTES # BLD: 1.8 K/UL (ref 0.8–3.5)
LYMPHOCYTES NFR BLD: 21 % (ref 12–49)
MCH RBC QN AUTO: 29.8 PG (ref 26–34)
MCHC RBC AUTO-ENTMCNC: 33 G/DL (ref 30–36.5)
MCV RBC AUTO: 90.1 FL (ref 80–99)
MONOCYTES # BLD: 0.5 K/UL (ref 0–1)
MONOCYTES NFR BLD: 6 % (ref 5–13)
NEUTS SEG # BLD: 5.8 K/UL (ref 1.8–8)
NEUTS SEG NFR BLD: 69 % (ref 32–75)
NRBC # BLD: 0 K/UL (ref 0–0.01)
NRBC BLD-RTO: 0 PER 100 WBC
P-R INTERVAL, ECG05: 164 MS
PLATELET # BLD AUTO: 335 K/UL (ref 150–400)
PMV BLD AUTO: 11.2 FL (ref 8.9–12.9)
POTASSIUM SERPL-SCNC: 4.3 MMOL/L (ref 3.5–5.1)
Q-T INTERVAL, ECG07: 372 MS
QRS DURATION, ECG06: 94 MS
QTC CALCULATION (BEZET), ECG08: 404 MS
RBC # BLD AUTO: 3.83 M/UL (ref 3.8–5.2)
SODIUM SERPL-SCNC: 137 MMOL/L (ref 136–145)
VENTRICULAR RATE, ECG03: 71 BPM
WBC # BLD AUTO: 8.4 K/UL (ref 3.6–11)

## 2020-10-23 PROCEDURE — 74011250636 HC RX REV CODE- 250/636: Performed by: SURGERY

## 2020-10-23 PROCEDURE — 74011250637 HC RX REV CODE- 250/637: Performed by: SURGERY

## 2020-10-23 PROCEDURE — 80048 BASIC METABOLIC PNL TOTAL CA: CPT

## 2020-10-23 PROCEDURE — 36415 COLL VENOUS BLD VENIPUNCTURE: CPT

## 2020-10-23 PROCEDURE — 85025 COMPLETE CBC W/AUTO DIFF WBC: CPT

## 2020-10-23 RX ORDER — HYDROMORPHONE HYDROCHLORIDE 2 MG/1
2 TABLET ORAL
Qty: 25 TAB | Refills: 0 | Status: SHIPPED | OUTPATIENT
Start: 2020-10-23 | End: 2020-10-27 | Stop reason: SDUPTHER

## 2020-10-23 RX ADMIN — ACETAMINOPHEN 1000 MG: 500 TABLET ORAL at 06:21

## 2020-10-23 RX ADMIN — SODIUM CHLORIDE, SODIUM LACTATE, POTASSIUM CHLORIDE, AND CALCIUM CHLORIDE 125 ML/HR: 600; 310; 30; 20 INJECTION, SOLUTION INTRAVENOUS at 00:30

## 2020-10-23 RX ADMIN — ONDANSETRON 4 MG: 2 INJECTION INTRAMUSCULAR; INTRAVENOUS at 06:26

## 2020-10-23 RX ADMIN — GABAPENTIN 200 MG: 100 CAPSULE ORAL at 09:38

## 2020-10-23 RX ADMIN — HYDROMORPHONE HYDROCHLORIDE 4 MG: 2 TABLET ORAL at 09:38

## 2020-10-23 RX ADMIN — LISINOPRIL 20 MG: 20 TABLET ORAL at 09:38

## 2020-10-23 RX ADMIN — HYDROMORPHONE HYDROCHLORIDE 4 MG: 2 TABLET ORAL at 04:34

## 2020-10-23 RX ADMIN — ACETAMINOPHEN 1000 MG: 500 TABLET ORAL at 00:29

## 2020-10-23 NOTE — PROGRESS NOTES
Bedside and Verbal shift change report given to Niurka Kuhn RN (oncoming nurse) by King Brenden RN (offgoing nurse). Report included the following information SBAR, Kardex, Intake/Output, MAR, Recent Results and Cardiac Rhythm NSR.

## 2020-10-23 NOTE — DISCHARGE INSTRUCTIONS
[facility] at [facility]  Bariatric Surgery Discharge Instructions     Procedure Laparoscopic gastric bypass     Future Appointments   Date Time Provider Nevin Vanesa   11/4/2020  1:00 PM DEIRDRE Matthew BS AMB   11/18/2020  1:00 PM DEIRDRE Matthew AMB   12/2/2020  1:00 PM DEIRDRE Matthew BS AMB         Contact Information:    [facility] at [facility], [address] [facility], [address]  [phone]    After Hours and Weekends  [phone] On Call Surgeon    Non Emergent Medical Needs  Call during office hours or send a message via My Chart   (messages returned during business hours)    DIET    Please remember that you are on ONLY LIQUIDS for the first 2 weeks after surgery. Do not advance to the next phase until advised by your surgeon or Nurse Practitioner. Refer to the Bariatric Handbook for detailed information. TO PREVENT DEHYDRATION:  consume 48-64 ounces of liquids daily. At least 48 ounces of that should come from water, Crystal Light, sugar free popsicles, sugar free gelatin or other calorie-free, sugar-free, caffeine free and noncarbonated beverages. Do not drink with a straw.  Sip, sip, sip throughout the day   Main priority is to stay hydrated   Aim for 60 grams of protein every day. Most of your protein will come from shakes. Refer to the Bariatric Handbook for detailed information.    Add additional protein supplements to meet protein needs (protein powder, clear protein such as protein water, non-fat dry milk powder, NO protein bars at this at this stage)     MEDICATIONS & VITAMINS     Pre-surgery medications should be reviewed with your Bariatric provider and taken as prescribed    Take no more than 2 pills at a time and wait 15-20 minutes between pills       Pain Medication  The first few days home, you may require narcotic pain medication to manage your pain.  Take this medication only as prescribed. If your pain is mild to moderate, try taking Acetaminophen (Tylenol) 500 mg 1-2 tablets every 8 hours or as directed by your provider. Avoid taking antiinflammatory medications (NSAID'S) such as Ibuprofen (Motrin, Advil) or Naproxen (Aleve). These medications can be harmful to your stomach and cause bleeding and ulcers. There is a complete list of NSAID medications to AVOID in your handbook. Abdominal support (Spanx or body shaper) and heat (heating pad on low setting) are very helpful in managing pain after surgery. Acid Reducing (\"heartburn/reflux\") Medication   Acid reducing medicine should have been prescribed at your pre-surgery visit. It is recommended you take this medication every day even if you have no symptoms of reflux or heartburn. If you were previously on a medication for reflux/heartburn you should continue the medication daily. *It is common to experience reflux or heartburn after sleeve gastrectomy. These symptoms can usually be managed with medication, diet and behavior changes. In most cases symptoms improve or resolve after a few weeks to a couple of months. Nausea Medication  You should have been prescribed medication for nausea at your pre-surgery visit. If you are experiencing nausea, please take the medication as prescribed to try and get relief. If the nausea medication is not effective, please call your surgeon's office. Constipation   Constipation can be caused by pain medication and reduced food and water intake. Drink at least 64 oz. fluid. OK to use OTC medications such as Benefiber, Milk of Magnesia, Dulcolax, Miralax, senna       Vitamins   Calcium Citrate with Vitamin D-3 - Take 1200--1500 mg  each day. Divide doses throughout the day. Do not take more than 600 mg at one time. Take at least 2 hours before or after your multivitamin and/or iron supplement.   Multivitamin containing Iron - 2 multivitamins with 100% Daily Value of Iron, Folic Acid and Thiamine   Vitamin D-3 - Take 3000 IU  per day  Vitamin B-12 - Oral or Sublingual: 350-500 mcg/day OR 1000 mcg Monthly intramuscular shot        ACTIVITY     Be active. Sit up as much as possible. Walk often. Walking and/or foot exercises will help prevent blood clots.  Continue to sip liquids throughout the day   Continue to use your incentive spirometer 4 to 5 times per day   Keep your incisions clean and dry to prevent infection.  Showering is ok. No submersion in water for 2 weeks (No tubs, pools, etc.)   Weight lifting restrictions:  10 lbs. for the first 2 weeks, 20 lbs. for the next 4 to 6 weeks    TOP REASONS TO CONTACT YOUR SURGEONS'S OFFICE     You have severe pain or discomfort unrelieved by pain medication.  You have been vomiting for more than 24 hours. Call sooner if you are unable to drink any fluids.  Temperature rises above 101 degrees.  You have persistent nausea and/or vomiting.  You are unable to swallow liquids    Increased swelling, redness, or drainage from your incision sites.

## 2020-10-23 NOTE — DISCHARGE SUMMARY
Physician Discharge Summary     Patient ID:  Rosalinda Florian  [id number]  30 y.o.  1990    Allergies: Latex; Bactrim [sulfamethoxazole-trimethoprim]; Ceclor [cefaclor]; Erythromycin; Keflex [cephalexin]; Morphine; and Sulfa (sulfonamide antibiotics)    Admit Date: 10/21/2020    Discharge Date: 10/23/2020    * Admission Diagnoses: Morbid obesity ([facility] [E66.01]    * Discharge Diagnoses:    Hospital Problems as of 10/23/2020 Date Reviewed: 10/21/2020          Codes Class Noted - Resolved POA    Morbid obesity ([facility] ICD-10-CM: E66.01  ICD-9-CM: 278.01  3/28/2012 - Present Unknown    Overview Addendum 3/28/2012  2:28 PM by Denisha Calvillo NP     Overweight since childhood. Highest adult weight is 316.5 pounds. Lowest adult weight is 220 pounds at age 24. Admission Condition: Good    * Discharge Condition: good    * Procedures: Procedure(s):  LAPAROSCOPIC REVISION OF SLEEVE GASTRECTOMY TO LAPAROSCOPIC GASTRIC BYPASS WITH EGD (E R A S)  ESOPHAGOGASTRODUODENOSCOPY (EGD)    * Hospital Course:   Normal hospital course for this procedure. Consults: None    Significant Diagnostic Studies: N/A    * Disposition: Home    Discharge Medications:   Current Discharge Medication List      START taking these medications    Details   HYDROmorphone (DILAUDID) 2 mg tablet Take 1 Tab by mouth every four (4) hours as needed for Pain for up to 7 days. Max Daily Amount: 12 mg.  Qty: 25 Tab, Refills: 0    Associated Diagnoses: S/P gastric bypass         CONTINUE these medications which have NOT CHANGED    Details   omeprazole (PRILOSEC) 20 mg capsule Take 1 Cap by mouth daily. Take every day after surgery  Qty: 30 Cap, Refills: 1    Associated Diagnoses: Gastroesophageal reflux disease, esophagitis presence not specified      lisinopriL (PRINIVIL, ZESTRIL) 20 mg tablet Take 20 mg by mouth daily. levonorgestrel (MIRENA) 20 mcg/24 hours (5 yrs) 52 mg IUD 1 Device by IntraUTERine route once.       ondansetron [facility] ODT) 4 mg disintegrating tablet Take 1 Tab by mouth every eight (8) hours as needed for Nausea or Nausea or Vomiting (after surgery). Qty: 20 Tab, Refills: 0      polyethylene glycol (MIRALAX) 17 gram packet Take 1 Packet by mouth daily. Indications: constipation  Qty: 30 Packet, Refills: 3         STOP taking these medications       chlorthalidone (HYGROTEN) 25 mg tablet Comments:   Reason for Stopping:               * Follow-up Care/Patient Instructions:   Activity: No heavy lifting, pushing, pulling x 4 weeks  Diet: bariatric liquids  Wound Care: Keep wounds clean and dry    Follow-up Information     Follow up With Specialties Details Why Contact Info    None    None ([phone]) Patient stated that they have no PCP            Future Appointments   Date Time Provider Nevin Moralesisti   11/4/2020  1:00 PM Reji Gates NP [facility] BS AMB   11/18/2020  1:00 PM DEIRDRE Perry AMB   12/2/2020  1:00 PM Reji Gates NP [facility] BS AMB         Signed:  Casey Ferrer MD  10/23/2020  7:39 AM

## 2020-10-23 NOTE — PROGRESS NOTES
Patient progressing towards goals.      Problem: Patient Education: Go to Patient Education Activity  Goal: Patient/Family Education  Outcome: Progressing Towards Goal     Problem: Gastric Sleeve Pathway / Bariatric Revision Pathway: Post-Op Day 2  Goal: Off Pathway (Use only if patient is Off Pathway)  Outcome: Progressing Towards Goal  Goal: Activity/Safety  Outcome: Progressing Towards Goal  Goal: Diagnostic Test/Procedures  Outcome: Progressing Towards Goal  Goal: Nutrition/Diet  Outcome: Progressing Towards Goal  Goal: Discharge Planning  Outcome: Progressing Towards Goal  Goal: Medications  Outcome: Progressing Towards Goal  Goal: Respiratory  Outcome: Progressing Towards Goal  Goal: Treatments/Interventions/Procedures  Outcome: Progressing Towards Goal  Goal: Psychosocial  Outcome: Progressing Towards Goal     Problem: Gastric Sleeve Pathway / Bariatric Revision Pathway: Discharge Outcomes  Goal: *Active bowel sounds  Outcome: Progressing Towards Goal  Goal: *Absence of signs and symptoms of DVT  Outcome: Progressing Towards Goal  Goal: *Hemodynamically stable  Outcome: Progressing Towards Goal  Goal: *Lungs clear or at baseline  Outcome: Progressing Towards Goal  Goal: *Demonstrates independent activity or return to baseline  Outcome: Progressing Towards Goal  Goal: *Optimal pain control at patient's stated goal  Outcome: Progressing Towards Goal  Goal: *Verbalizes understanding and describes prescribed diet  Outcome: Progressing Towards Goal  Goal: *Tolerating diet  Outcome: Progressing Towards Goal  Goal: *Verbalizes name, dosage, time, side effects, and number of days to continue medications  Outcome: Progressing Towards Goal  Goal: *No signs and symptoms of infection or wound complications  Outcome: Progressing Towards Goal  Goal: *Anxiety reduced or absent  Outcome: Progressing Towards Goal  Goal: *Understands and describes signs and symptoms to report to providers (eg: s/s of leak, DVT; inability to tolerate diet)  Outcome: Progressing Towards Goal  Goal: *Describes follow-up/return visits to physicians  Outcome: Progressing Towards Goal  Goal: *Describes available resources and support systems  Outcome: Progressing Towards Goal     Problem: Falls - Risk of  Goal: *Absence of Falls  Description: Document Mitch Click Fall Risk and appropriate interventions in the flowsheet.   Outcome: Progressing Towards Goal  Note: Fall Risk Interventions:            Medication Interventions: Patient to call before getting OOB, Teach patient to arise slowly    Elimination Interventions: Call light in reach              Problem: Patient Education: Go to Patient Education Activity  Goal: Patient/Family Education  Outcome: Progressing Towards Goal     Problem: Discharge Planning  Goal: *Discharge to safe environment  Outcome: Progressing Towards Goal  Goal: *Knowledge of medication management  Outcome: Progressing Towards Goal  Goal: *Knowledge of discharge instructions  Outcome: Progressing Towards Goal     Problem: Patient Education: Go to Patient Education Activity  Goal: Patient/Family Education  Outcome: Progressing Towards Goal

## 2020-10-23 NOTE — PROGRESS NOTES
Progress Note    Patient: Amaryllis Goodell MRN: [id number]  SSN: [id number]    YOB: 1990  Age: 31 y.o. Sex: female      Admit Date: 10/21/2020    2 Days Post-Op    Procedure:  Procedure(s):  LAPAROSCOPIC REVISION OF SLEEVE GASTRECTOMY TO LAPAROSCOPIC GASTRIC BYPASS WITH EGD (E R A S)    Subjective:     Patient has good incisional pain control. She denies nausea. She is ambulating and performing spirometry. Tolerating 4 oz/hr liquids consistently. Objective:     Visit Vitals  /71   Pulse 62   Temp 97.4 °F (36.3 °C)   Resp 18   Ht 5' 8\" (1.727 m)   Wt 265 lb 6.9 oz (120.4 kg)   SpO2 95%   BMI 40.36 kg/m²       Temp (24hrs), Av.9 °F (36.6 °C), Min:97.4 °F (36.3 °C), Max:98.3 °F (36.8 °C)    Date 10/22/20 0700 - 10/23/20 0659 10/23/20 07 - 10/24/20 0659   Shift 8515-1832 4561-1609 24 Hour Total 3163-6737 5815-1494 24 Hour Total   INTAKE   P.O. 390  390        P.O. 390  390      Shift Total(mL/kg) 390(3.2)  390(3.2)      OUTPUT   Urine(mL/kg/hr) 400(0.3) 1100(0.8) 1500(0.5)        Urine Voided 400 1100 1500      Drains 30  30        Output (ml) (Josiah Drain #1 10/21/20 Right Abdomen) 30  30      Shift Total(mL/kg) 430(3.6) 1100(9.1) 1530(12.7)      NET -40 -1100 -1140      Weight (kg) 120.4 120.4 120.4 120.4 120.4 120.4       Physical Exam:    ABDOMEN: Obese, nondistended, soft. Wounds dry and intact. Serosanguineous drain fluid. Appropriate incisional pain with palpation.     Data Review: Vital signs, ins/outs, labs, UGI (no leak or obstruction)    Lab Review:   Recent Results (from the past 12 hour(s))   CBC WITH AUTOMATED DIFF    Collection Time: 10/23/20  5:54 AM   Result Value Ref Range    WBC 8.4 3.6 - 11.0 K/uL    RBC 3.83 3.80 - 5.20 M/uL    HGB 11.4 (L) 11.5 - 16.0 g/dL    HCT 34.5 (L) 35.0 - 47.0 %    MCV 90.1 80.0 - 99.0 FL    MCH 29.8 26.0 - 34.0 PG    MCHC 33.0 30.0 - 36.5 g/dL    RDW 16.0 (H) 11.5 - 14.5 %    PLATELET 873 339 - 907 K/uL    MPV 11.2 8.9 - 12.9 FL    NRBC 0.0 0  WBC    ABSOLUTE NRBC 0.00 0.00 - 0.01 K/uL    NEUTROPHILS 69 32 - 75 %    LYMPHOCYTES 21 12 - 49 %    MONOCYTES 6 5 - 13 %    EOSINOPHILS 3 0 - 7 %    BASOPHILS 1 0 - 1 %    IMMATURE GRANULOCYTES 0 0.0 - 0.5 %    ABS. NEUTROPHILS 5.8 1.8 - 8.0 K/UL    ABS. LYMPHOCYTES 1.8 0.8 - 3.5 K/UL    ABS. MONOCYTES 0.5 0.0 - 1.0 K/UL    ABS. EOSINOPHILS 0.2 0.0 - 0.4 K/UL    ABS. BASOPHILS 0.1 0.0 - 0.1 K/UL    ABS. IMM. GRANS. 0.0 0.00 - 0.04 K/UL    DF AUTOMATED     METABOLIC PANEL, BASIC    Collection Time: 10/23/20  5:54 AM   Result Value Ref Range    Sodium 137 136 - 145 mmol/L    Potassium 4.3 3.5 - 5.1 mmol/L    Chloride 104 97 - 108 mmol/L    CO2 26 21 - 32 mmol/L    Anion gap 7 5 - 15 mmol/L    Glucose 83 65 - 100 mg/dL    BUN 6 6 - 20 MG/DL    Creatinine 0.75 0.55 - 1.02 MG/DL    BUN/Creatinine ratio 8 (L) 12 - 20      GFR est AA >60 >60 ml/min/1.73m2    GFR est non-AA >60 >60 ml/min/1.73m2    Calcium 9.1 8.5 - 10.1 MG/DL         Assessment:     Hospital Problems  Date Reviewed: 10/21/2020          Codes Class Noted POA    Morbid obesity ([address] 75.) ICD-10-CM: E66.01  ICD-9-CM: 278.01  3/28/2012 Unknown    Overview Addendum 3/28/2012  2:28 PM by France Merlin, NP     Overweight since childhood. Highest adult weight is 316.5 pounds. Lowest adult weight is 220 pounds at age 24. Plan/Recommendations/Medical Decision Makin. Remove drain. 2. Discharge to home on bariatric liquids.

## 2020-10-23 NOTE — PROGRESS NOTES
Bedside shift change report given to Deedee Harris (oncoming nurse) by Roland Weiss RN (offgoing nurse). Report included the following information SBAR, Kardex, ED Summary, OR Summary, Procedure Summary, Intake/Output, MAR, Accordion, Recent Results and Med Rec Status.

## 2020-10-26 ENCOUNTER — TELEPHONE (OUTPATIENT)
Dept: SURGERY | Age: 30
End: 2020-10-26

## 2020-10-26 NOTE — TELEPHONE ENCOUNTER
Bariatric Post-Operative Phone Calls: 48 hour phone call    Diet:Question of any nausea and/or vomiting. Protein intake (goal is 60 grams of protein daily)   Poor____Fair____Good_x___Great____     Comment:_____35grams_________________________________________________________      ______________________________________________________________________    Hydration:Less than 32 ounces of water daily is fair to poor (Goal is 64 ounces per day)   Poor____ Fair____ Good__x__Great____    Comment:__48 ounces____________________________________________________________    ______________________________________________________________________      Ambulation:( walking at least 3 x week, for 15- 20 minutes)     Poor______ Fair______ Good___x___     Great______ Comment:__________________________________________________    ______________________________________________________________________      Urine Color: Question of any odor and color(should be barb, pale, and clear) Dark______ Amber______ Pale______      Clear___x___ Comment:___________________________________________________                           ________________________________________________________________    Bowel movements: Question of any constipation- haven't had any bowel movements for more than 3 days. This could be related to protein intake and/or narcotic pain medication usage. Comment:                                                         No bm as yet instructed her to take a laxative                                                                      Pain: Left sided abdominal pain is normal (should be less than 3)  Question if pain medication is helpful.  10___ 9___ 8___ 7___ 6___ 5__x_ 4___ 3___     2___1___0___Comment:_________________________________________________    ______________________________________________________________________      Incision: (No redness, pain, swelling or fever) Healing Well___x___ Healed______Redness_________ Pain_________     Swelling_________ Fever__________(greater than 101 needs evaluation)    Comment:____________________________________________________________    ______________________________________________________________________  Use of incentive spirometer: Yes__x__       No           Next Appointment:____11/4/2020__________                 Support Group: Yes______No______    Additional Comments:____________________________________________________________    ____________________________________________________________________      If more than one parameter is not met or considered poor, nurse needs to discuss with provider recommend for patient to be seen in the office as soon as possible or refer to the provider for follow-up. Reinforce to patient to use bariatric educational booklet as guide. It is appropriate to refer patient to the nutritionist to discuss more in detail of diet and nutrition.

## 2020-10-26 NOTE — TELEPHONE ENCOUNTER
----- Message from Jeannine Larson LPN sent at  10:41 AM EDT -----  Regardin day post op call  Lap sleeve gastrectomy converted to lap gastric bypass discharged on 10/23/2020

## 2020-10-27 DIAGNOSIS — Z98.84 S/P GASTRIC BYPASS: ICD-10-CM

## 2020-10-27 RX ORDER — HYDROMORPHONE HYDROCHLORIDE 2 MG/1
2 TABLET ORAL
Qty: 8 TAB | Refills: 0 | Status: SHIPPED | OUTPATIENT
Start: 2020-10-27 | End: 2020-10-30

## 2020-10-27 NOTE — TELEPHONE ENCOUNTER
I called the patient back and she said her  had all her medications opened and on the counter including her vitamins and she has 3 cats and they knocked all her meds into the sink, vitamins and Dilaudid and she said she had her  call because he left the pills bottles opened. I told her Dr Alberts Dwight calculated how much she should of had left and he will send in the refill. Patient said she has already reordered her vitamins. Pt in agreement.

## 2020-10-27 NOTE — TELEPHONE ENCOUNTER
Patient's  called and stated that the gabapentin medication is not working. Patient had been getting relief with the dilaudid but the  left the cap off the bottle and the family cat jumped on the counter and knocked the pill bottle into the sink.  is asking if patient can have a refill on dilaudid. He stated you can call his number or the patient's number for call back.

## 2020-11-04 ENCOUNTER — OFFICE VISIT (OUTPATIENT)
Dept: SURGERY | Age: 30
End: 2020-11-04
Payer: MEDICAID

## 2020-11-04 VITALS
TEMPERATURE: 98 F | SYSTOLIC BLOOD PRESSURE: 138 MMHG | WEIGHT: 257.4 LBS | RESPIRATION RATE: 19 BRPM | OXYGEN SATURATION: 95 % | BODY MASS INDEX: 39.01 KG/M2 | DIASTOLIC BLOOD PRESSURE: 93 MMHG | HEIGHT: 68 IN | HEART RATE: 68 BPM

## 2020-11-04 DIAGNOSIS — E66.01 MORBID OBESITY (HCC): Primary | ICD-10-CM

## 2020-11-04 DIAGNOSIS — Z98.84 S/P GASTRIC BYPASS: ICD-10-CM

## 2020-11-04 PROCEDURE — 99024 POSTOP FOLLOW-UP VISIT: CPT | Performed by: NURSE PRACTITIONER

## 2020-11-04 RX ORDER — CYCLOBENZAPRINE HCL 10 MG
10 TABLET ORAL
Qty: 20 TAB | Refills: 0 | Status: SHIPPED | OUTPATIENT
Start: 2020-11-04

## 2020-11-04 NOTE — PATIENT INSTRUCTIONS
Constipation: Care Instructions  Your Care Instructions     Constipation means that you have a hard time passing stools (bowel movements). People pass stools from 3 times a day to once every 3 days. What is normal for you may be different. Constipation may occur with pain in the rectum and cramping. The pain may get worse when you try to pass stools. Sometimes there are small amounts of bright red blood on toilet paper or the surface of stools. This is because of enlarged veins near the rectum (hemorrhoids). A few changes in your diet and lifestyle may help you avoid ongoing constipation. Your doctor may also prescribe medicine to help loosen your stool. Some medicines can cause constipation. These include pain medicines and antidepressants. Tell your doctor about all the medicines you take. Your doctor may want to make a medicine change to ease your symptoms. Follow-up care is a key part of your treatment and safety. Be sure to make and go to all appointments, and call your doctor if you are having problems. It's also a good idea to know your test results and keep a list of the medicines you take. How can you care for yourself at home? · Drink plenty of fluids, enough so that your urine is light yellow or clear like water. If you have kidney, heart, or liver disease and have to limit fluids, talk with your doctor before you increase the amount of fluids you drink. · Include high-fiber foods in your diet each day. These include fruits, vegetables, beans, and whole grains. · Get at least 30 minutes of exercise on most days of the week. Walking is a good choice. You also may want to do other activities, such as running, swimming, cycling, or playing tennis or team sports. · Take a fiber supplement, such as Citrucel or Metamucil, every day. Read and follow all instructions on the label. · Schedule time each day for a bowel movement. A daily routine may help.  Take your time having your bowel movement. · Support your feet with a small step stool when you sit on the toilet. This helps flex your hips and places your pelvis in a squatting position. · Your doctor may recommend an over-the-counter laxative to relieve your constipation. Examples are Milk of Magnesia and MiraLax. Read and follow all instructions on the label. Do not use laxatives on a long-term basis. When should you call for help? Call your doctor now or seek immediate medical care if:    · You have new or worse belly pain.     · You have new or worse nausea or vomiting.     · You have blood in your stools. Watch closely for changes in your health, and be sure to contact your doctor if:    · Your constipation is getting worse.     · You do not get better as expected. Where can you learn more? Go to http://www.todd.com/  Enter P343 in the search box to learn more about \"Constipation: Care Instructions. \"  Current as of: June 26, 2019               Content Version: 12.6  © 9539-1711 [employer]. Care instructions adapted under license by [facility] (which disclaims liability or warranty for this information). If you have questions about a medical condition or this instruction, always ask your healthcare professional. [address] any warranty or liability for your use of this information. What you need to know:  1. Advance your diet to soft foods. Follow the handout that you were given today in the office. 2.  Take the recommended vitamins daily  3. No lifting greater than 20 lbs. 4.  You can do light jogging and walking. 5  Follow up in 2 weeks. 6.  You may go into a pool. 7.  If you are not able to tolerate liquids or soft foods. Please call our office. [phone]  8. If you have vomiting and persistent epigastric pain or chest pain. You should call our office, the doctor on-call or go to the emergency room.       Constipation  Benefiber, Miralax & similar (once or twice daily)  Milk of Magnesia (daily as needed)  Dulcolax suppository  Fleets Enema    Soft and Mushy   What is this diet?  Introduces soft, easy to digest foods   Low fat, no sugar added    When do I begin?  Once instructed by your surgeon or NP. Usually 2 -3 weeks after surgery       You will stay on this diet until instructed to start the next phase. What foods can I eat?  Moist, mushy foods (see approved list of foods)       Key Points   Continue to drink 48-64 ounces of low calorie, non-carbonated, sugar free beverages between meals.  Eat 3 meals per day   Measure each meal to ?cup per meal   Aim for 60 grams of protein every day. Try food sources of protein first.    Continue to supplement with protein shakes/powder to meet protein goals.  Take small bites. Try eating with smaller utensils (baby spoon, cocktail fork).  Chew food thoroughly   Allow about 30 minutes to eat a meal.  Eating too fast may cause nausea or vomiting.  Stop eating as soon as you feel full. Overeating may stretch your stomach's capacity and prevent desired weight loss.  Do not drink liquids during meals and 30 minutes after meals. Drinking with meals may cause nausea or vomiting.  Add one new food at a time   Take vitamins daily                     Shopping Lists    Soft and Mushy  In addition to everything on the Bariatric Liquid diet, you may add these foods to your diet.   Protein - include with every meal   Egg or egg substitute     Low fat or fat-free cottage cheese     Low fat or fat-free yogurt    Low fat Greek yogurt    Fat-free, 1% milk, or Lactaid milk    Low-fat or vegetarian refried beans    Well-cooked beans and lentils   Fat-free or 2% reduced-fat cheese    Hummus    Low fat soup     Snacks/Other Options:   Whole wheat crackers   Sugar free fudgsicles    Sugar free cocoa    No sugar added pudding         Fruits and Vegetables   Applesauce (no sugar added)   Canned fruit (no sugar added)   Fresh soft peeled fruits (melons, banana, avocado, berries)   Any soft cooked vegetables    Mashed potatoes, Sweet potatoes, baked potatoes (no skin)  Condiments   Fat free non-stick spray   Herbs and spices   Lite butter, margarine, canola oil, olive oil   Reduced-fat or fat-free price   Reduced-fat or fat-free salad dressing   Reduced-fat or fat-free cream cheese   Reduced-fat or fat-free sour cream   Lemon juice   Salt, pepper, mustard, ketchup, salsa    Prepare food to the appropriate texture. Sample Meal Plan:  Soft and Mushy    Breakfast ½ cup plain oatmeal with protein powder. Add cinnamon, nutmeg, Splenda brown sugar as desired for flavor 20-25 grams protein   Snack (optional) High protein gelatin (recipe on www.unjury. com) 10 grams protein   Lunch ½ cup low fat cottage cheese or [place] yogurt with soft fruit 10 - 15 grams protein    Snack (optional) High protein pudding or high protein popsicle (recipe on www.unjury. com) 10 grams protein   Dinner ¼ cup low-fat well cooked beans with low-fat cheese sprinkled on top  ¼ cup no sugar added applesauce (can sprinkle protein powder) 5-8 grams protein  5-10  grams protein

## 2020-11-04 NOTE — PROGRESS NOTES
2 weeks status post  Revision from Sleeve to gastric bypass. Pt reports doing well on liquids . Patient no complaints of pain  Pt reports no nausea and no vomiting  She is drinking approximately 40 oz of water daily  She states that the sneezed the other day and felt a pull at the left lateral incision. The area has been burning every since. She has been using the heating pad and tylenol is not helping with relief.     + BM  She is drinking 50-60 grams of protein daily. She is taking bariatric vitamins but does not like them. We discussed alternatives. Total weight loss since surgery 19.4lbs  Weight loss since last visit 19.4lbs  Visit Vitals  BP (!) 138/93 (BP 1 Location: Left arm, BP Patient Position: Sitting)   Pulse 68   Temp 98 °F (36.7 °C) (Oral)   Resp 19   Ht 5' 8\" (1.727 m)   Wt 257 lb 6.4 oz (116.8 kg)   SpO2 95%   BMI 39.14 kg/m²              Ms. Sonny Goltz has a reminder for a \"due or due soon\" health maintenance. I have asked that she contact her primary care provider for follow-up on this health maintenance. Physical Examination: General appearance - alert, well appearing, and in no distress,  Chest - clear to auscultation bilaterally  Heart - normal rate, regular rhythm, normal S1, S2, no murmurs, rubs, clicks or gallops  Abdomen - soft, slight tenderness at left lateral incision, nondistended  scars from previous incisions healing without erythema or induration. A/P    Doing well 2 weeks status post revision from Sleeve to  laparoscopic Gastric Bypass  Diet advanced to soft foods. Focus on 50-60 grams of protein daily. Encouraged water intake to 64 oz of non-carbonated/no calorie beverages daily. Supplement with unflavored protein powder daily. Continue PPI  May use spanx to help with compression. Flexeril prescribed to help with left lateral incision pain. No lifting greater than 20 lbs. Follow up in 2 weeks.        Pt  verbalized understanding and questions were answered to the best of my knowledge and ability. Diet educational materials were provided.       Juaquin Mcelroy, NP

## 2020-11-04 NOTE — PROGRESS NOTES
1. Have you been to the ER, urgent care clinic since your last visit? Hospitalized since your last visit? No     2. Have you seen or consulted any other health care providers outside of the [address] [place] [address] since your last visit? Include any pap smears or colon screening.  No

## 2020-11-11 ENCOUNTER — TELEPHONE (OUTPATIENT)
Dept: SURGERY | Age: 30
End: 2020-11-11

## 2020-11-11 NOTE — TELEPHONE ENCOUNTER
Patient identified with two patient identifiers. Patient 3 weeks post op with C/O knot a little bigger than a quarter at the end of her incision, increased pain, redness, and purulent drainage. Patient states the swelling has now caused the incision to open up, the drainage is greenish yellow. She has no C/O fever. Patient informed office follow up needed. Will bring in tomorrow morning for wound eval at 9am.  Patient instructed to arrive 10-15 min early to check in. Message sent to [facility] to schedule.

## 2020-11-11 NOTE — TELEPHONE ENCOUNTER
Patient stated that she has a knot on her incision about the size of a quarter and it is red. Patient believes it might be infected.

## 2020-11-12 ENCOUNTER — OFFICE VISIT (OUTPATIENT)
Dept: SURGERY | Age: 30
End: 2020-11-12
Payer: MEDICAID

## 2020-11-12 VITALS
WEIGHT: 258 LBS | HEIGHT: 68 IN | DIASTOLIC BLOOD PRESSURE: 89 MMHG | SYSTOLIC BLOOD PRESSURE: 138 MMHG | OXYGEN SATURATION: 98 % | HEART RATE: 92 BPM | TEMPERATURE: 98.5 F | RESPIRATION RATE: 18 BRPM | BODY MASS INDEX: 39.1 KG/M2

## 2020-11-12 DIAGNOSIS — Z98.84 S/P GASTRIC BYPASS: ICD-10-CM

## 2020-11-12 DIAGNOSIS — E66.01 MORBID OBESITY (HCC): Primary | ICD-10-CM

## 2020-11-12 DIAGNOSIS — T14.8XXA WOUND INFECTION: ICD-10-CM

## 2020-11-12 DIAGNOSIS — L08.9 WOUND INFECTION: ICD-10-CM

## 2020-11-12 PROCEDURE — 99024 POSTOP FOLLOW-UP VISIT: CPT | Performed by: NURSE PRACTITIONER

## 2020-11-12 RX ORDER — AMOXICILLIN AND CLAVULANATE POTASSIUM 875; 125 MG/1; MG/1
1 TABLET, FILM COATED ORAL 2 TIMES DAILY
Qty: 20 TAB | Refills: 0 | Status: SHIPPED | OUTPATIENT
Start: 2020-11-12 | End: 2020-11-22

## 2020-11-12 NOTE — PATIENT INSTRUCTIONS
Amoxicillin/Clavulanate Potassium (By mouth)   Amoxicillin (f-uuk-q-RAMESH-in), Clavulanate Potassium (LHVS-gf-kr-valerie wgn-WCP-og-um)  Treats infections. This medicine is a penicillin antibiotic. Brand Name(s): Augmentin, Augmentin ES-600, Augmentin XR   There may be other brand names for this medicine. When This Medicine Should Not Be Used: This medicine is not right for everyone. Do not use it if you had an allergic reaction to amoxicillin, clavulanate, or a similar antibiotic (penicillin or cephalosporin), or if you had liver problems caused by Augmentin®. How to Use This Medicine:   Liquid, Tablet, Chewable Tablet, Long Acting Tablet  · Your doctor will tell you how much medicine to use. Do not use more than directed. · Take this medicine with a snack or at the beginning of a meal to help prevent nausea. · Chewable tablets: Chew the tablet completely before you swallow it. · Measure the oral liquid medicine with a marked measuring spoon, oral syringe, or medicine cup. Shake the medicine well just before you measure each dose. Rinse the spoon or dropper after each use. · Swallow the extended-release tablet whole. Do not crush, break, or chew it. · Take all of the medicine in your prescription to clear up your infection, even if you feel better after the first few doses. · Missed dose: Take a dose as soon as you remember. If it is almost time for your next dose, wait until then and take a regular dose. Do not take extra medicine to make up for a missed dose. · Tablet, extended-release tablet, chewable tablet: Store at room temperature, away from heat, moisture, and direct light. · Oral liquid: Store in the refrigerator. Do not freeze. · Throw away any unused oral liquid after 10 days. Drugs and Foods to Avoid:   Ask your doctor or pharmacist before using any other medicine, including over-the-counter medicines, vitamins, and herbal products. · Some medicines can affect how this medicine works.  Tell your doctor if you are taking a blood thinner (such as warfarin), allopurinol, or probenecid. Warnings While Using This Medicine:   · Tell your doctor if you are pregnant or breastfeeding, or if you have kidney disease, liver disease, or mononucleosis (mono). · Birth control pills may not work as well while you are taking this medicine. Use another form of birth control to prevent pregnancy. · This medicine can cause diarrhea. Call your doctor if the diarrhea becomes severe, does not stop, or is bloody. Do not take any medicine to stop diarrhea until you have talked to your doctor. Diarrhea can occur 2 months or more after you stop taking this medicine. · Tell any doctor or dentist who treats you that you are using this medicine. This medicine may affect certain medical test results. · Call your doctor if your symptoms do not improve or if they get worse. · The chewable tablet and oral liquid contain phenylalanine. Talk to your doctor before you use this medicine if you have phenylketonuria (PKU). · Keep all medicine out of the reach of children. Never share your medicine with anyone. Possible Side Effects While Using This Medicine:   Call your doctor right away if you notice any of these side effects:  · Allergic reaction: Itching or hives, swelling in your face or hands, swelling or tingling in your mouth or throat, chest tightness, trouble breathing  · Blistering, peeling, red skin rash  · Change in how much or how often you urinate  · Dark urine or pale stools, nausea, vomiting, loss of appetite, stomach pain, yellow eyes or skin  · Diarrhea that may contain blood, stomach cramps  If you notice these less serious side effects, talk with your doctor:   · Diaper rash  · Mild diarrhea, nausea, vomiting  · Tooth discoloration (in children)  If you notice other side effects that you think are caused by this medicine, tell your doctor. Call your doctor for medical advice about side effects.  You may report side effects to FDA at 2-258-FDA-2292  © 2017 [address] Information is for End User's use only and may not be sold, redistributed or otherwise used for commercial purposes. The above information is an  only. It is not intended as medical advice for individual conditions or treatments. Talk to your doctor, nurse or pharmacist before following any medical regimen to see if it is safe and effective for you.

## 2020-11-12 NOTE — PROGRESS NOTES
1. Have you been to the ER, urgent care clinic since your last visit? Hospitalized since your last visit? no    2. Have you seen or consulted any other health care providers outside of the [address] [place] [address] since your last visit? Include any pap smears or colon screening.  no

## 2020-11-12 NOTE — PROGRESS NOTES
3 weeks status post gastric bypass. Pt reports doing well on liquids and soft foods. .    Patient no complaints of pain   Pt reports no nausea and no vomiting  She is drinking plenty of clear liquids and getting in 50-60 grams of protein. She is concerned about her left lateral incision getting infected. It was red and inflammed and yesterday was draining yellow, green pus. She got in the shower this morning and expressed more pus. She has gained 1 lb since her last ov  Visit Vitals  /89   Pulse 92   Temp 98.5 °F (36.9 °C) (Oral)   Resp 18   Ht 5' 8\" (1.727 m)   Wt 258 lb (117 kg)   SpO2 98%   BMI 39.23 kg/m²            Ms. Pierce has a reminder for a \"due or due soon\" health maintenance. I have asked that she contact her primary care provider for follow-up on this health maintenance. Physical Examination: General appearance - alert, well appearing, and in no distress,    Abdomen - soft, nontender, nondistended  scars from previous incisions healing without erythema or induration. Left lateral incision with slight redness noted at the edge of her inicison. No drainage expressed. No warmth. A/P    Doing well 3  weeks status post laparoscopic Gastric Bypass  Possible wound infection. Unable to express any drainage. Will place her on Augmentin. She has had a wound infection in the past.   Continue soft foods. Focus on 50-60 grams of protein daily. Encouraged water intake to 64 oz of non-carbonated/no calorie beverages daily. Supplement with unflavored protein powder daily. Continue PPI   Follow up in 1 weeks. Pt  verbalized understanding and questions were answered to the best of my knowledge and ability.          Daly Whittington NP

## 2020-11-13 ENCOUNTER — TELEPHONE (OUTPATIENT)
Dept: SURGERY | Age: 30
End: 2020-11-13

## 2020-11-13 NOTE — TELEPHONE ENCOUNTER
Bariatric Post-Operative Phone Calls: Week 3    Diet:Question of any nausea and/or vomiting. Question of tolerance to diet advancement from liquids to solids. Protein intake (goal is 60 grams of protein daily)   Poor____Fair____Good__x__Great____     Comment:____48- 50 grams__________________________________________________________      ______________________________________________________________________    Hydration:Less than 32 ounces of water daily is fair to poor (Goal is 64 ounces per day)   Poor____ Fair____ Good_x___Great____    Comment:______48-50 ounces________________________________________________________    ______________________________________________________________________      Ambulation:( walking at least 3 x week, for at least 30 minutes)   Poor______ Fair______ Good__x____     Great______ Comment:__________________________________________________    ______________________________________________________________________      Urine Color: Question of any odor and color(should be barb, pale, and clear) Dark______ Amber______ Pale__x___      Clear______ Comment:___________________________________________________                           ________________________________________________________________    Bowel movements: Question of any constipation- haven't had any bowel movements for more than 3 days. This could be related to protein intake and/or narcotic pain medication usage. Comment:                                                                       Having BM's                                                       Pain: Left sided abdominal pain is normal (should be less than 3)         Question if pain medication is helpful.  10___ 9___ 8___ 7___ 6___ 5___ 4___ 3___     2___1___0_x_Comment:_________________________________________________    ______________________________________________________________________      Incision: (No redness, pain, swelling or fever) Healing Well__x____ Healed______Redness_________ Pain_________     Swelling_________ Fever__________(greater than 101 needs evaluation)    Comment:_states she saw Elmer Pal NP yesterday and was given an antibiotic for the incision on the left all the other incisions look good___________________________________________________________    ______________________________________________________________________  Use of incentive spirometer: Yes____       No           Next Appointment:___11/18/2020___________                 Support Group: Yes______No______    Additional Comments:____________________________________________________________    ____________________________________________________________________      If more than one parameter is not met or considered poor, nurse needs to discuss with provider recommend for patient to be seen in the office as soon as possible or refer to the provider for follow-up. Reinforce to patient to use bariatric educational booklet as guide. It is appropriate to refer patient to the nutritionist to discuss more in detail of diet and nutrition.

## 2020-11-13 NOTE — TELEPHONE ENCOUNTER
----- Message from Fariba Cuba LPN sent at 21/00/5441 10:43 AM EDT -----  Regarding: 3 week post op call  Lap sleeve gastrectomy converted to a lap gastric bypass discharged on 10/23/2020

## 2020-11-18 ENCOUNTER — VIRTUAL VISIT (OUTPATIENT)
Dept: SURGERY | Age: 30
End: 2020-11-18
Payer: MEDICAID

## 2020-11-18 VITALS — HEIGHT: 68 IN | WEIGHT: 252 LBS | BODY MASS INDEX: 38.19 KG/M2

## 2020-11-18 DIAGNOSIS — Z98.84 S/P GASTRIC BYPASS: ICD-10-CM

## 2020-11-18 DIAGNOSIS — E66.01 MORBID OBESITY (HCC): Primary | ICD-10-CM

## 2020-11-18 PROCEDURE — 99024 POSTOP FOLLOW-UP VISIT: CPT | Performed by: NURSE PRACTITIONER

## 2020-11-18 NOTE — PROGRESS NOTES
1. Have you been to the ER, urgent care clinic since your last visit? Hospitalized since your last visit? No    2. Have you seen or consulted any other health care providers outside of the [address] [place] [address] since your last visit? Include any pap smears or colon screening.  No

## 2020-11-18 NOTE — PATIENT INSTRUCTIONS
Constipation: Care Instructions Your Care Instructions Constipation means that you have a hard time passing stools (bowel movements). People pass stools from 3 times a day to once every 3 days. What is normal for you may be different. Constipation may occur with pain in the rectum and cramping. The pain may get worse when you try to pass stools. Sometimes there are small amounts of bright red blood on toilet paper or the surface of stools. This is because of enlarged veins near the rectum (hemorrhoids). A few changes in your diet and lifestyle may help you avoid ongoing constipation. Your doctor may also prescribe medicine to help loosen your stool. Some medicines can cause constipation. These include pain medicines and antidepressants. Tell your doctor about all the medicines you take. Your doctor may want to make a medicine change to ease your symptoms. Follow-up care is a key part of your treatment and safety. Be sure to make and go to all appointments, and call your doctor if you are having problems. It's also a good idea to know your test results and keep a list of the medicines you take. How can you care for yourself at home? · Drink plenty of fluids, enough so that your urine is light yellow or clear like water. If you have kidney, heart, or liver disease and have to limit fluids, talk with your doctor before you increase the amount of fluids you drink. · Include high-fiber foods in your diet each day. These include fruits, vegetables, beans, and whole grains. · Get at least 30 minutes of exercise on most days of the week. Walking is a good choice. You also may want to do other activities, such as running, swimming, cycling, or playing tennis or team sports. · Take a fiber supplement, such as Citrucel or Metamucil, every day. Read and follow all instructions on the label. · Schedule time each day for a bowel movement. A daily routine may help. Take your time having your bowel movement. · Support your feet with a small step stool when you sit on the toilet. This helps flex your hips and places your pelvis in a squatting position. · Your doctor may recommend an over-the-counter laxative to relieve your constipation. Examples are Milk of Magnesia and MiraLax. Read and follow all instructions on the label. Do not use laxatives on a long-term basis. When should you call for help? Call your doctor now or seek immediate medical care if: 
  · You have new or worse belly pain.  
  · You have new or worse nausea or vomiting.  
  · You have blood in your stools. Watch closely for changes in your health, and be sure to contact your doctor if: 
  · Your constipation is getting worse.  
  · You do not get better as expected. Where can you learn more? Go to http://www.gray.com/ Enter 21 631.356.8611 in the search box to learn more about \"Constipation: Care Instructions. \" Current as of: June 26, 2019               Content Version: 12.6 © 1127-7110 [employer]. Care instructions adapted under license by [employer] (which disclaims liability or warranty for this information). If you have questions about a medical condition or this instruction, always ask your healthcare professional. [place] [address] any warranty or liability for your use of this information. What you need to know: 
1 . Advance your diet to moist meats. Follow the handout that you were given today in the office. 2. Take the recommended vitamins daily 3 No lifting greater than 40 lbs. 4. You can do light jogging, moderate walking and a recumbent bike. 5 Follow up in 2 weeks. 6. You may go into a pool. 7. If you are not able to tolerate liquids, soft foods or moist meats. Please call our office. [phone] 
8. If you have vomiting and persistent epigastric pain or chest pain. You should call our office, the doctor on-call or go to the emergency room. ? Constipation Benefiber, Miralax & similar (once or twice daily) Milk of Magnesia (daily as needed) Dulcolax suppository Fleets Enema Moist Meats What is this diet? ? This phase adds moist meats in addition to foods in Soft & Mushy ? Lean protein sources When do I begin? ? When directed by your NP or surgeon, usually 4 weeks after surgery What new foods can I eat? 
? Moist meat and poultry ? Moist fish and seafood Shopping Lists Protein - include with every meal 
? Tuna packed in water ? [place] (canned, frozen, fresh) ? White flaky fish (caron, cod, flounder, tilapia) ? Canned chicken packed in water ? 96-99% fat free thinly sliced deli meat (ham, turkey, chicken) ? Silken Tofu  
? Skinless turkey or chicken (prepare to a soft texture) ? Lean ground meat ? Lean pork (cooked until very tender, cut into small pieces) Sample Meal Plan:  Moist Meats Breakfast ½ cup soft cooked eggs (2) 16 grams protein Snack (optional) Low-fat string cheese 5 grams protein Lunch 2 slices of lean deli turkey (2 oz.), ¼ cup soft fruit or ½ cup tuna salad made with low-fat mayonnaise 14 grams protein 14 grams protein Snack (optional) Greek yogurt or low-fat cottage cheese or low-fat string cheese 8-15 grams protein Dinner Soft/flaky fish (2 oz.) ¼ cup soft cooked vegetables 14 grams protein

## 2020-11-18 NOTE — PROGRESS NOTES
4 weeks status post gastric bypass. Pt reports doing well on liquids and soft foods. Patient no complaints of pain  Pt reports vomiting once when she ate too much of an egg. No nausea  Sheis drinking approximately 48+ oz of water daily  + BM  She is drinking and eating 40-50 grams of protein daily. She is still taking Augmentin for the left side incision. She is taking bariatric vitamins without issue. Total weight loss since surgery 24.45lbs  Weight loss since last visit 6lbs  Visit Vitals  Ht 5' 8\" (1.727 m)   Wt 252 lb (114.3 kg)   BMI 38.32 kg/m²              Ms. Demetrius Gibson has a reminder for a \"due or due soon\" health maintenance. I have asked that she contact her primary care provider for follow-up on this health maintenance. Physical Examination: General appearance - alert, well appearing, and in no distress,  Lungs- no distress  Abdomen - soft, nontender, nondistended  scars from previous incisions healing . Left lateral incision- erythema resolving. No drainage. A/P    Doing well 4 weeks status post laparoscopic Gastric Bypass  Continue Augmentin  Diet advanced to soft meats. Focus on 50-60 grams of protein daily. Encouraged water intake to 64 oz of non-carbonated/no calorie beverages daily. Supplement with unflavored protein powder daily. No lifting greater than 40 lbs. Follow up in 2 weeks. May walk for exercise. Pt verbalized understanding and questions were answered to the best of my knowledge and ability.           Seema More NP

## 2020-12-04 ENCOUNTER — VIRTUAL VISIT (OUTPATIENT)
Dept: SURGERY | Age: 30
End: 2020-12-04
Payer: MEDICAID

## 2020-12-04 VITALS — HEIGHT: 68 IN | WEIGHT: 242 LBS | BODY MASS INDEX: 36.68 KG/M2

## 2020-12-04 DIAGNOSIS — K91.2 POSTOPERATIVE INTESTINAL MALABSORPTION: ICD-10-CM

## 2020-12-04 DIAGNOSIS — Z09 FOLLOW-UP EXAMINATION AFTER ABDOMINAL SURGERY: Primary | ICD-10-CM

## 2020-12-04 PROCEDURE — 99024 POSTOP FOLLOW-UP VISIT: CPT | Performed by: NURSE PRACTITIONER

## 2020-12-04 NOTE — PATIENT INSTRUCTIONS
Plan on follow up in 8 weeks    Continue your vitamin supplements as well as any protein shakes, powders or fernandez every day with a goal of 60 g of protein per day. The left-sided incision will heal.  Wash in the shower as you normally would and keep it clean and dry. Solid Textured Foods    What is this diet?  This phase is a slow reintroduction of textured food, starting with those easiest tolerated   Supplement with protein shakes/powders as needed    What foods can I eat?  Lean Protein    Vegetables   Fruit   Low Fat Dairy    Choose foods wisely. Think about the nutritional benefit of the food. Protein first and at each meal.  Include produce (vegetables and/or fruits) at each meal.    Limit or eliminate \"filler\" foods such as breads, pasta, potatoes, rice, crackers, pretzels, and the like. Avoid eating and drinking together, there just isn't enough room! You may continue protein supplementation if needed to meet your daily protein goals. Many patients use a protein shake or bar to replace a meal.  There are a variety of protein supplements listed in your handbook. Remember, the dietician is available for additional support. For an appointment, simply call or e-mail Lorrie Wilcox RD at [phone] or [email or web address]. Take your vitamins every day, attend support group and keep your regular follow-up appointments. Ultimately, success depends on you. Choose to use your tool and we will guide you along the way! Zoom Support Group   2nd Thursday of each Month from 6-7 pm   The next one is 12/10/20 6-7 pm   You can access the link at [email or web address]  Go to the Calendar tab and click on the date and it should go right to the link     Additional Resources:  Carter Massing:  [email or web address]    Offering online support groups and pre-recorded videos  o Every Wednesday evening at 7:00 pm   Via Take the Interview 144 page - Marrianne Angelucci Cares  o Recipes, Advice, and Motivation from fellow patients  Bariatric Pal: [email or web address]  [facility] has launched a podcast!  Hosted by Lauryn Whyte, our podcast will cover topics about obesity, pre-weight loss surgery, post-weight loss surgery, food addiction, emotional eating, myths about weight loss surgery, and more. Each episode will feature an expert in the field of weight loss and bariatric surgery who can provide a deeper insight into these topics.   [email or web address]:  [employer]   Offering discounted exercise programs (8 Week programs, On-Demand/Virtual)   See flyer   [address] at [email or web address] or [phone]

## 2020-12-04 NOTE — PROGRESS NOTES
1. Have you been to the ER, urgent care clinic since your last visit? Hospitalized since your last visit? No    2. Have you seen or consulted any other health care providers outside of the [address] [place] [address] since your last visit? Include any pap smears or colon screening.  No

## 2020-12-04 NOTE — PROGRESS NOTES
I was in the office while conducting this encounter. Consent:  She and/or her healthcare decision maker is aware that this patient-initiated Telehealth encounter is a billable service, with coverage as determined by her insurance carrier. She is aware that she may receive a bill and has provided verbal consent to proceed: No - Not billable    This virtual visit was conducted via [employer]. Pursuant to the emergency declaration under the [facility] waiver authority and the [employer] Act, this Virtual  Visit was conducted to reduce the patient's risk of exposure to COVID-19 and provide continuity of care for an established patient. Services were provided through a video synchronous discussion virtually to substitute for in-person clinic visit. Due to this being a TeleHealth evaluation, many elements of the physical examination are unable to be assessed. Total Time: minutes: 5-10 minutes. Chief Complaint   Patient presents with    Surgical Follow-up     6 weeks s/p lapgastric bypass down 34.5 pounds. lost 10 pounds       Claudia Li 6-week status post laparoscopic gastric bypass. She presents virtually today for obesity management. She has lost some of 35 pounds and is feeling pretty well. Denies fever, chills, chest pain or shortness of breath. She has been tolerating soft mushy diet including some moist meats. She says she still does better with things that are very soft or soupy. She has been trying to get in her protein and still uses a supplement every day. Protein intake is approximately 40 to 45 g/day.   She is tolerating her liquids and getting in about 40 ounces a day  No nausea or vomiting no  Occasional regurgitation  She is moving her bowels most days and voiding without difficulty  She is taking her bariatric vitamins every day  She has been walking every day for activity  She is off antibiotics but still has a little bit of drainage from the left trocar site incision. The area has been scabbed over she says when she cleans it the scab comes off a little bit of drainage comes out. There is no odor and no longer has any pain in this area. She has no abdominal pain. There is no redness to the skin  Visit Vitals  Ht 5' 8\" (1.727 m)   Wt 242 lb (109.8 kg)   BMI 36.80 kg/m²     She appears well and she is actually in the car as a passenger going to [facility] is clear breathing is unlabored  Abdomen appears soft and obese, the left trocar site incision appears scabbed over there is no erythema or induration and no visible drainage at this time. Other lap sites appear well-healed    ICD-10-CM ICD-9-CM    1. Follow-up examination after abdominal surgery  Z09 V67.09    2. Postoperative intestinal malabsorption  K91.2 579.3    3. BMI 36.0-36.9,adult  Z68.36 V85.36      Skylar Sales is 6 weeks status post gastric bypass doing well  Left trocar site is mostly healed without further signs of infection. She was advised to shower as normal and gently wash the area keep clean and dry and cover if necessary  She can advance her diet to bariatric regular texture  She is to continue her bariatric daily vitamins as well as protein supplementation  Daily walking and she is free to add strength training in any activities as tolerated and desired  No work restrictions  Provide support group information via [address]  She advised to follow-up in about 8 weeks sooner if necessary  Jocelyn Andrew verbalized understanding and questions were answered to the best of my knowledge and ability. Diet, activity and support group educational materials were provided.

## 2021-02-16 ENCOUNTER — TELEPHONE (OUTPATIENT)
Dept: SURGERY | Age: 31
End: 2021-02-16

## 2021-02-26 ENCOUNTER — TELEPHONE (OUTPATIENT)
Dept: SURGERY | Age: 31
End: 2021-02-26

## 2021-08-23 ENCOUNTER — TELEPHONE (OUTPATIENT)
Dept: SURGERY | Age: 31
End: 2021-08-23

## 2022-08-24 ENCOUNTER — TELEPHONE (OUTPATIENT)
Dept: SURGERY | Age: 32
End: 2022-08-24

## 2022-10-31 ENCOUNTER — TRANSCRIBE ORDER (OUTPATIENT)
Dept: REGISTRATION | Age: 32
End: 2022-10-31

## 2022-10-31 ENCOUNTER — HOSPITAL ENCOUNTER (OUTPATIENT)
Dept: GENERAL RADIOLOGY | Age: 32
Discharge: HOME | End: 2022-10-31
Payer: COMMERCIAL

## 2022-10-31 DIAGNOSIS — M47.812 CERVICAL SPONDYLOSIS WITHOUT MYELOPATHY: ICD-10-CM

## 2022-10-31 DIAGNOSIS — M47.812 CERVICAL SPONDYLOSIS WITHOUT MYELOPATHY: Primary | ICD-10-CM

## 2022-10-31 PROCEDURE — 72050 X-RAY EXAM NECK SPINE 4/5VWS: CPT

## 2023-05-29 RX ORDER — POLYETHYLENE GLYCOL 3350 17 G/17G
17 POWDER, FOR SOLUTION ORAL DAILY
COMMUNITY
Start: 2020-09-29

## 2023-05-29 RX ORDER — CYCLOBENZAPRINE HCL 10 MG
10 TABLET ORAL 3 TIMES DAILY PRN
COMMUNITY
Start: 2020-11-04

## 2023-05-29 RX ORDER — LISINOPRIL 20 MG/1
20 TABLET ORAL DAILY
COMMUNITY

## 2023-05-29 RX ORDER — ONDANSETRON 4 MG/1
4 TABLET, ORALLY DISINTEGRATING ORAL EVERY 8 HOURS PRN
COMMUNITY
Start: 2020-09-29

## 2023-05-29 RX ORDER — OMEPRAZOLE 20 MG/1
20 CAPSULE, DELAYED RELEASE ORAL DAILY
COMMUNITY
Start: 2020-09-29

## (undated) DEVICE — TUBING, SUCTION, 1/4" X 12', STRAIGHT: Brand: MEDLINE

## (undated) DEVICE — ACCESS PLATFORM FOR MINIMALLY INVASIVE SURGERY: Brand: GELPOINT®  MINI ADVANCED ACCESS PLATFORM

## (undated) DEVICE — STERILE POLYISOPRENE POWDER-FREE SURGICAL GLOVES WITH EMOLLIENT COATING: Brand: PROTEXIS

## (undated) DEVICE — INFECTION CONTROL KIT SYS

## (undated) DEVICE — VISUALIZATION SYSTEM: Brand: CLEARIFY

## (undated) DEVICE — Device

## (undated) DEVICE — STRAP,POSITIONING,KNEE/BODY,FOAM,4X60": Brand: MEDLINE

## (undated) DEVICE — LAPAROSCOPIC TROCAR SLEEVE/SINGLE USE: Brand: KII® OPTICAL ACCESS SYSTEM

## (undated) DEVICE — ENDO CARRY-ON PROCEDURE KIT INCLUDES ENZYMATIC SPONGE, GAUZE, BIOHAZARD LABEL, TRAY, LUBRICANT, DIRTY SCOPE LABEL, WATER LABEL, TRAY, DRAWSTRING PAD, AND DEFENDO 4-PIECE KIT.: Brand: ENDO CARRY-ON PROCEDURE KIT

## (undated) DEVICE — STAPLER INT W25MM WHT TRNSOR CIR ANVIL W/ ADVANCING PROX

## (undated) DEVICE — NEEDLE HYPO 22GA L1.5IN BLK S STL HUB POLYPR SHLD REG BVL

## (undated) DEVICE — DEVICE TRNSF SPIK STL 2008S] MICROTEK MEDICAL INC]

## (undated) DEVICE — TROCAR: Brand: KII® SLEEVE

## (undated) DEVICE — REM POLYHESIVE ADULT PATIENT RETURN ELECTRODE: Brand: VALLEYLAB

## (undated) DEVICE — SOLUTION IV 250ML 0.9% SOD CHL CLR INJ FLX BG CONT PRT CLSR

## (undated) DEVICE — SOLUTION IV 1000ML 0.9% SOD CHL

## (undated) DEVICE — SUTURE MCRYL SZ 4-0 L27IN ABSRB UD L19MM PS-2 1/2 CIR PRIM Y426H

## (undated) DEVICE — RESERVOIR,SUCTION,100CC,SILICONE: Brand: MEDLINE

## (undated) DEVICE — SUT ETHLN 2-0 18IN FS BLK --

## (undated) DEVICE — DEVICE SUT VLT PRELD W/ POLYSRB 2-0 L48IN SUT DISP FOR LAP

## (undated) DEVICE — STAPLER INT 60 UNIV PUR W/ TRI-STAPLE TECHNOLOGY ULT FOR

## (undated) DEVICE — DRAPE,UTILTY,TAPE,15X26, 4EA/PK: Brand: MEDLINE

## (undated) DEVICE — SUTURE SZ 0 27IN 5/8 CIR UR-6  TAPER PT VIOLET ABSRB VICRYL J603H

## (undated) DEVICE — DEVICE SUT 0 L48IN GRN POLY BRAID LD UNIT DISP SURGDAC

## (undated) DEVICE — STAPLER INT AD L L25MM DIA12MM INTLUMN WHT TI CIR CUT 2 ROW

## (undated) DEVICE — POWER SHELL: Brand: SIGNIA

## (undated) DEVICE — BAG SPEC REM 224ML W4XL6IN DIA10MM 1 HND GYN DISP ENDOPCH

## (undated) DEVICE — ARTICULATING RELOAD WITH TRI-STAPLE TECHNOLOGY: Brand: ENDO GIA

## (undated) DEVICE — CLICKLINE SCISSORS INSERT: Brand: CLICKLINE

## (undated) DEVICE — FILTER SMK EVAC FLO CLR MEGADYNE

## (undated) DEVICE — GOWN,SIRUS,FABRNF,XL,20/CS: Brand: MEDLINE

## (undated) DEVICE — SURGICAL PROCEDURE KIT GEN LAPAROSCOPY LF

## (undated) DEVICE — TROCAR: Brand: KII® OPTICAL ACCESS SYSTEM

## (undated) DEVICE — SOLUTION IRRIG 1000ML H2O STRL BLT

## (undated) DEVICE — SUTURING DEVICE: Brand: ENDO STITCH

## (undated) DEVICE — Z INACTIVE USE 2240337 DRAPE SURG PT TRANSFER TRAWAY SHT

## (undated) DEVICE — ELECTRODE ES 36CM LAP FLAT L HK COAT DISP CLEANCOAT

## (undated) DEVICE — JELLY,LUBE,STERILE,FLIP TOP,TUBE,4-OZ: Brand: MEDLINE

## (undated) DEVICE — DRAIN SURG 19FR 100% SIL RADPQ RND CHN FULL FLUT

## (undated) DEVICE — GARMENT,MEDLINE,DVT,INT,CALF,MED, GEN2: Brand: MEDLINE

## (undated) DEVICE — PREP SKN CHLRAPRP APL 26ML STR --

## (undated) DEVICE — DISSECTOR CRV JAW 48CM CRDLS -- SONICISION

## (undated) DEVICE — SYRINGE IRRIG 60ML SFT PLIABLE BLB EZ TO GRP 1 HND USE W/

## (undated) DEVICE — DERMABOND SKIN ADH 0.7ML -- DERMABOND ADVANCED 12/BX

## (undated) DEVICE — AIR SHEET,LAT,COMFORT GLIDE, BLEND 40X80: Brand: MEDLINE

## (undated) DEVICE — TROCARS: Brand: KII® OPTICAL ACCESS SYSTEM